# Patient Record
Sex: FEMALE | Race: BLACK OR AFRICAN AMERICAN | NOT HISPANIC OR LATINO | Employment: UNEMPLOYED | URBAN - METROPOLITAN AREA
[De-identification: names, ages, dates, MRNs, and addresses within clinical notes are randomized per-mention and may not be internally consistent; named-entity substitution may affect disease eponyms.]

---

## 2019-08-13 ENCOUNTER — EVALUATION (OUTPATIENT)
Dept: PHYSICAL THERAPY | Facility: CLINIC | Age: 54
End: 2019-08-13
Payer: COMMERCIAL

## 2019-08-13 DIAGNOSIS — M54.16 LUMBAR RADICULOPATHY: Primary | ICD-10-CM

## 2019-08-13 DIAGNOSIS — G89.29 CHRONIC PAIN OF RIGHT KNEE: ICD-10-CM

## 2019-08-13 DIAGNOSIS — M25.561 CHRONIC PAIN OF RIGHT KNEE: ICD-10-CM

## 2019-08-13 PROCEDURE — 97162 PT EVAL MOD COMPLEX 30 MIN: CPT | Performed by: PHYSICAL THERAPIST

## 2019-08-13 NOTE — LETTER
2019    [facility] [place] [address]    Patient: Marsha Arguello   YOB: 1965   Date of Visit: 2019     Encounter Diagnosis     ICD-10-CM    1  Lumbar radiculopathy M54 16    2  Chronic pain of right knee M25 561     G89 29        Dear Dr Maddie Arvizu:    Thank you for your recent referral of Marsha Arguello  Please review the attached evaluation summary from Radha's recent visit  Please verify that you agree with the plan of care by signing the attached order  If you have any questions or concerns, please do not hesitate to call  I sincerely appreciate the opportunity to share in the care of one of your patients and hope to have another opportunity to work with you in the near future  Sincerely,    Nik He, PT      Referring Provider:      I certify that I have read the below Plan of Care and certify the need for these services furnished under this plan of treatment while under my care  Jw Lauren PA-C  [address] [place] [address]: [phone]          PT Evaluation     Today's date: 2019  Patient name: Marsha Arguello  : 1965  MRN: [id number]  Referring provider: Saturnino Leblanc  Dx:   Encounter Diagnosis     ICD-10-CM    1  Lumbar radiculopathy M54 16    2  Chronic pain of right knee M25 561     G89 29                   Assessment  Assessment details: Patient is a 48year old female with signs and symptoms consistent with lumbar radiculopathy resulting in difficulty with ADLs and functional activities  She presents with deficits in lumbar ROM, flexibility, and LE strength with greatest deficits on R side vs  L  Additional special tests were deferred this date due to patient's pain  Directional preference was unable to be determined this date  She would benefit from skilled PT to increase overall mobility, decrease pain, and improve function     Impairments: abnormal gait, abnormal muscle firing, abnormal or restricted ROM, abnormal movement, activity intolerance, impaired physical strength, lacks appropriate home exercise program, pain with function, weight-bearing intolerance, poor posture  and poor body mechanics  Barriers to therapy: Patient plans to move by next month, will need to transition sites  Understanding of Dx/Px/POC: good   Prognosis: good    Goals  ST  Decrease pain at worst to 7/10 in 4 weeks  2  Increase RLE strength to 4/5 in 4 weeks  LT  Increase lumbar ROM to > 50% in all directions in 8 weeks  2  Able to lift > 10 lbs floor to waist with good mechanics in 8 weeks  3  Independent in HEP in 8 weeks  Plan  Plan details: Patient was encouraged on POC 2x/week but was agreeable to POC 1x/week  Patient would benefit from: skilled physical therapy  Referral necessary: No  Planned modality interventions: cryotherapy, TENS and thermotherapy: hydrocollator packs  Planned therapy interventions: abdominal trunk stabilization, balance, flexibility, functional ROM exercises, home exercise program, therapeutic exercise, therapeutic activities, stretching, strengthening, postural training, patient education, neuromuscular re-education, massage, manual therapy and joint mobilization  Frequency: 1x week  Duration in weeks: 8  Treatment plan discussed with: patient and referring physician        Subjective Evaluation    History of Present Illness  Mechanism of injury: 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  She notes that she did a lot of liting and walking  She notes that she started to notice that her back would hurt throughout the day  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  She notes that her legs were also hurting  She notes that this started in September last year  She notes that she was taking ibproufen daily and it wasn't helping   She notes that she had a patient who was total assistance, performing a lift she had pain in her back  She describes it as a crack  She notes that since then the back has been bad  She notes that at the time she sat down and rested thinking it would improve, but it didn't  She notes that the worker's compensation claim was denied  She notes that she has not worked since 2019  She notes that she has had follow-up with orthopedic and pain management since the injury  She notes that she does have intermittent numbness/tingling in R foot  She denies saddle paresthesias  She denies changes in bowel or bladder control  She notes that she is not currently working due to her injuries     Pain  Current pain ratin  At best pain ratin  At worst pain rating: 10  Location: low back into buttock (R) and into R knee, into the calf  Quality: sharp and tight  Relieving factors: rest, ice and medications  Aggravating factors: sitting and standing  Progression: worsening    Social Support  Lives in: multiple-level home      Diagnostic Tests  X-ray: abnormal  Treatments  Previous treatment: injection treatment and medication (back)  Patient Goals  Patient goals for therapy: increased strength, independence with ADLs/IADLs, decreased pain, increased motion, improved balance and return to work  Patient goal: learn exercises to do at home, and get more in control of my pain        Objective     Neurological Testing     Sensation     Lumbar   Left   Intact: light touch    Right   Diminished: light touch    Reflexes   Left   Clonus sign: negative    Right   Patellar (L4): normal (2+)  Clonus sign: negative    Active Range of Motion     Lumbar   Flexion: 50 degrees   Extension: 25 degrees   Left lateral flexion: 25 degrees       Right lateral flexion: 25 degrees   Left rotation: 50 degrees   Right rotation: 50 degrees     Additional Active Range of Motion Details  Repeated testing deferred secondary to patient's complaints of pain    Strength/Myotome Testing     Lumbar   Left Heel walk: normal  Toe walk: normal    Right   Toe walk: abnormal    Left Hip   Planes of Motion   Flexion: 4+  Abduction: 4  External rotation: 4    Right Hip   Planes of Motion   Flexion: 4-  Abduction: 4  External rotation: 3+    Left Knee   Flexion: 4  Extension: 4    Right Knee   Flexion: 4-  Extension: 4-    Left Ankle/Foot   Dorsiflexion: 4  Plantar flexion: 4    Right Ankle/Foot   Dorsiflexion: 4  Plantar flexion: 3+    Ambulation     Comments   Ambulates with Trendelenburg compensation with decreased trunk rotation and arm swing with decreased TKE on R stance phase      Flowsheet Rows      Most Recent Value   PT/OT G-Codes   Current Score  23   Projected Score  46   FOTO information reviewed  Yes             Diagnosis: Lumbar Radiculopathy   Precautions:    Manual Therapy 8/13/19                                               Exercise Diary  8/13/19                                                                                                                                                                       Modalities

## 2019-08-14 ENCOUNTER — TRANSCRIBE ORDERS (OUTPATIENT)
Dept: PHYSICAL THERAPY | Facility: CLINIC | Age: 54
End: 2019-08-14

## 2019-08-14 DIAGNOSIS — M25.561 CHRONIC PAIN OF RIGHT KNEE: ICD-10-CM

## 2019-08-14 DIAGNOSIS — G89.29 CHRONIC PAIN OF RIGHT KNEE: ICD-10-CM

## 2019-08-14 DIAGNOSIS — M54.16 LUMBAR RADICULOPATHY: Primary | ICD-10-CM

## 2019-08-19 ENCOUNTER — APPOINTMENT (OUTPATIENT)
Dept: PHYSICAL THERAPY | Facility: CLINIC | Age: 54
End: 2019-08-19
Payer: COMMERCIAL

## 2019-08-21 ENCOUNTER — APPOINTMENT (OUTPATIENT)
Dept: PHYSICAL THERAPY | Facility: CLINIC | Age: 54
End: 2019-08-21
Payer: COMMERCIAL

## 2019-08-22 ENCOUNTER — OFFICE VISIT (OUTPATIENT)
Dept: PHYSICAL THERAPY | Facility: CLINIC | Age: 54
End: 2019-08-22
Payer: COMMERCIAL

## 2019-08-22 DIAGNOSIS — M25.561 CHRONIC PAIN OF RIGHT KNEE: ICD-10-CM

## 2019-08-22 DIAGNOSIS — G89.29 CHRONIC PAIN OF RIGHT KNEE: ICD-10-CM

## 2019-08-22 DIAGNOSIS — M54.16 LUMBAR RADICULOPATHY: Primary | ICD-10-CM

## 2019-08-22 PROCEDURE — 97112 NEUROMUSCULAR REEDUCATION: CPT | Performed by: PHYSICAL THERAPIST

## 2019-08-22 PROCEDURE — 97110 THERAPEUTIC EXERCISES: CPT | Performed by: PHYSICAL THERAPIST

## 2019-08-22 NOTE — PROGRESS NOTES
Daily Note     Today's date: 2019  Patient name: Justin Izaguirre  : 1965  MRN: [id number]  Referring provider: Star Ruano PA-C  Dx:   Encounter Diagnosis     ICD-10-CM    1  Lumbar radiculopathy M54 16    2  Chronic pain of right knee M25 561     G89 29                   Subjective: Patient reports that the R knee is stiff and sore  She notes that she has a knot in the R thigh  She notes pain and difficulty with going up stairs due to knee pain  Objective: See treatment diary below      Assessment: Tolerated treatment well  Patient would benefit from continued PT  She tolerated session well with flexion preference demonstrated  She does require frequent rest breaks to avoid SOB  She tolerated trunk rotations with minimal knee flexion secondary to tightness and pain  She was challenged with quad sets but demonstrated improved activation with increased repetitions  She had no increased pain post session  Plan: Continue per plan of care  Trial gastroc stretch as able and heel slides for R knee        Diagnosis: Lumbar Radiculopathy   Precautions:    Manual Therapy 19                                              Exercise Diary  19      Recumbent Bike  5'       Quad Set  5"x20      Trunk Rotations  20x      Heel slides                                                Seated ball flexion  5"x10ea      Chair hamstring stretch  10x10"                                                                              Modalities  19      CP - knee  10'       HP - L/S  10' concurrent

## 2019-08-26 ENCOUNTER — APPOINTMENT (OUTPATIENT)
Dept: PHYSICAL THERAPY | Facility: CLINIC | Age: 54
End: 2019-08-26
Payer: COMMERCIAL

## 2019-08-27 ENCOUNTER — OFFICE VISIT (OUTPATIENT)
Dept: PHYSICAL THERAPY | Facility: CLINIC | Age: 54
End: 2019-08-27
Payer: COMMERCIAL

## 2019-08-27 DIAGNOSIS — G89.29 CHRONIC PAIN OF RIGHT KNEE: ICD-10-CM

## 2019-08-27 DIAGNOSIS — M25.561 CHRONIC PAIN OF RIGHT KNEE: ICD-10-CM

## 2019-08-27 DIAGNOSIS — M54.16 LUMBAR RADICULOPATHY: Primary | ICD-10-CM

## 2019-08-27 PROCEDURE — 97110 THERAPEUTIC EXERCISES: CPT | Performed by: PHYSICAL THERAPIST

## 2019-08-27 PROCEDURE — 97112 NEUROMUSCULAR REEDUCATION: CPT | Performed by: PHYSICAL THERAPIST

## 2019-08-27 NOTE — PROGRESS NOTES
Daily Note     Today's date: 2019  Patient name: Donna Loza  : 1965  MRN: [id number]  Referring provider: Matt Mendoza PA-C  Dx:   Encounter Diagnosis     ICD-10-CM    1  Lumbar radiculopathy M54 16    2  Chronic pain of right knee M25 561     G89 29                   Subjective: Patient reports that she was sore after last session, but it got better after she laid down for a bit  She notes that she has increased swelling and tightness/pain in the R knee today  She notes her daughter's house where she's staying has a lot of stairs  Objective: See treatment diary below      Assessment: Tolerated treatment well  Patient would benefit from continued PT  She had improved AROM of R knee post session  She has limited sitting tolerance, requiring standing rest breaks  She does demonstrate improved lumbar rotation mobility evident during trunk rotations  She remains challenged with R quad activation evident during quad sets  She would benefit from progression of standing exercises as able  She had no increase in subjective report of pain post session  Plan: Continue per plan of care  Trial TKE as able        Diagnosis: Lumbar Radiculopathy   Precautions:    Manual Therapy 19                                             Exercise Diary  19     Recumbent Bike  5'  5'     Quad Set  5"x20 5"x20     Trunk Rotations  20x 20x     Heel slides        Supine fall outs   Red 10xea     clamshells        SLR flexion                        Seated ball flexion  5"x10ea 5"x20     Chair hamstring stretch  10x10" 10x10"     Seated heel slides with foam roll    5"x20     TKE                                                                Modalities  19     CP - knee  10'  10'     HP - L/S  10' concurrent 10' concurrent

## 2019-09-05 ENCOUNTER — APPOINTMENT (OUTPATIENT)
Dept: PHYSICAL THERAPY | Facility: CLINIC | Age: 54
End: 2019-09-05
Payer: COMMERCIAL

## 2019-09-06 ENCOUNTER — OFFICE VISIT (OUTPATIENT)
Dept: PHYSICAL THERAPY | Facility: CLINIC | Age: 54
End: 2019-09-06
Payer: COMMERCIAL

## 2019-09-06 DIAGNOSIS — M25.561 CHRONIC PAIN OF RIGHT KNEE: ICD-10-CM

## 2019-09-06 DIAGNOSIS — M54.16 LUMBAR RADICULOPATHY: Primary | ICD-10-CM

## 2019-09-06 DIAGNOSIS — G89.29 CHRONIC PAIN OF RIGHT KNEE: ICD-10-CM

## 2019-09-06 PROCEDURE — 97110 THERAPEUTIC EXERCISES: CPT | Performed by: PHYSICAL THERAPIST

## 2019-09-06 PROCEDURE — 97140 MANUAL THERAPY 1/> REGIONS: CPT | Performed by: PHYSICAL THERAPIST

## 2019-09-06 PROCEDURE — 97112 NEUROMUSCULAR REEDUCATION: CPT | Performed by: PHYSICAL THERAPIST

## 2019-09-06 NOTE — PROGRESS NOTES
Daily Note     Today's date: 2019  Patient name: Yolanda Elias  : 1965  MRN: [id number]  Referring provider: Stephania Ardon PA-C  Dx:   Encounter Diagnosis     ICD-10-CM    1  Lumbar radiculopathy M54 16    2  Chronic pain of right knee M25 561     G89 29                   Subjective: She notes that she feels really tight today  She notes that she saw pain management yesterday and they changed her pain medication  She notes that she's been sleeping on the floor at her daughters and she's been tossing and turning a lot and she thinks her increased hip pain is from that  Objective: See treatment diary below      Assessment: Tolerated treatment well  Patient would benefit from continued PT  She tolerated initiation of piriformis stretch well this date with both manual and self stretch  She had decreased subjective report of pain post session  She demonstrated greater R piriformis restriction than L  She had improved gait speed post session with less muscle guarding  Plan: Continue per plan of care  Progress with core and glute strengthening as able        Diagnosis: Lumbar Radiculopathy   Precautions:    Manual Therapy 19    Gentle hamstring, gastroc, piriformis stretch    10' RS                                    Exercise Diary  19    Recumbent Bike  5'  5' 5'     Quad Set  5"x20 5"x20     Trunk Rotations  20x 20x 20x    Heel slides        Supine fall outs   Red 10xea     clamshells        SLR flexion        Supine piriformis stretch with towel    10x10"            Seated ball flexion  5"x10ea 5"x20 5"x20    Chair hamstring stretch  10x10" 10x10"     Seated heel slides with foam roll    5"x20 5"x20    TKE                                                                Modalities  19     CP - knee  10'  10'     HP - L/S  10' concurrent 10' concurrent

## 2019-09-10 ENCOUNTER — APPOINTMENT (OUTPATIENT)
Dept: PHYSICAL THERAPY | Facility: CLINIC | Age: 54
End: 2019-09-10
Payer: COMMERCIAL

## 2019-09-12 ENCOUNTER — TRANSCRIBE ORDERS (OUTPATIENT)
Dept: PHYSICAL THERAPY | Facility: CLINIC | Age: 54
End: 2019-09-12

## 2019-09-12 ENCOUNTER — EVALUATION (OUTPATIENT)
Dept: PHYSICAL THERAPY | Facility: CLINIC | Age: 54
End: 2019-09-12
Payer: COMMERCIAL

## 2019-09-12 DIAGNOSIS — M54.16 LUMBAR RADICULOPATHY: Primary | ICD-10-CM

## 2019-09-12 DIAGNOSIS — G89.29 CHRONIC PAIN OF RIGHT KNEE: ICD-10-CM

## 2019-09-12 DIAGNOSIS — M25.561 CHRONIC PAIN OF RIGHT KNEE: ICD-10-CM

## 2019-09-12 PROCEDURE — 97140 MANUAL THERAPY 1/> REGIONS: CPT | Performed by: PHYSICAL THERAPIST

## 2019-09-12 PROCEDURE — 97112 NEUROMUSCULAR REEDUCATION: CPT | Performed by: PHYSICAL THERAPIST

## 2019-09-12 PROCEDURE — 97110 THERAPEUTIC EXERCISES: CPT | Performed by: PHYSICAL THERAPIST

## 2019-09-12 NOTE — LETTER
2019    [facility] [place] [address]    Patient: Garett Rodriguez   YOB: 1965   Date of Visit: 2019     Encounter Diagnosis     ICD-10-CM    1  Lumbar radiculopathy M54 16    2  Chronic pain of right knee M25 561     G89 29        Dear Dr Han Maldonado:    Thank you for your recent referral of Garett Rodriguez  Please review the attached evaluation summary from Radha's recent visit  Please verify that you agree with the plan of care by signing the attached order  If you have any questions or concerns, please do not hesitate to call  I sincerely appreciate the opportunity to share in the care of one of your patients and hope to have another opportunity to work with you in the near future  Sincerely,    Chantale Barros, PT      Referring Provider:      I certify that I have read the below Plan of Care and certify the need for these services furnished under this plan of treatment while under my care  Janet Burrell PA-C  [address] [place] [address]: [phone]          PT Re-Evaluation     Today's date: 2019  Patient name: Garett Rodriguez  : 1965  MRN: [id number]  Referring provider: Juliette Hernandez PA-C  Dx:   Encounter Diagnosis     ICD-10-CM    1  Lumbar radiculopathy M54 16    2  Chronic pain of right knee M25 561     G89 29                   Assessment  Assessment details: Patient is a 48year old female with signs and symptoms consistent with lumbar radiculopathy resulting in difficulty with ADLs and functional activities  She presents with improvements in ROM, flexibility, and strength with greatest improvements in lumbar ROM  She remains limited in R knee ROM, flexibility secondary to effusion and pain  She would benefit from continued skilled PT to address these residual deficits   She was agreeable to continued POC to include transition to strengthening of both core and hip stabilization  She was agreeable to plan to increase POC to 2x/week from 1x/week when she has increased dependable transportation  Impairments: abnormal gait, abnormal muscle firing, abnormal or restricted ROM, abnormal movement, activity intolerance, impaired physical strength, lacks appropriate home exercise program, pain with function, weight-bearing intolerance, poor posture  and poor body mechanics  Barriers to therapy: Lumbar radiculopathy and R knee osteoarthritis; limited POC secondary to transportation  Understanding of Dx/Px/POC: good   Prognosis: good    Goals  ST  Decrease pain at worst to 7/10 in 4 weeks  - Progressing  2  Increase RLE strength to 4/5 in 4 weeks  - Progressing     LT  Increase lumbar ROM to > 50% in all directions in 8 weeks  - Progressing (flexion improved to 75%)  2  Able to lift > 10 lbs floor to waist with good mechanics in 8 weeks  - Not met  3  Independent in HEP in 8 weeks       Plan  Plan details: Patient was encouraged on POC 2x/week but was agreeable to POC 1x/week for now  Patient would benefit from: skilled physical therapy  Referral necessary: No  Planned modality interventions: cryotherapy, TENS and thermotherapy: hydrocollator packs  Planned therapy interventions: abdominal trunk stabilization, balance, flexibility, functional ROM exercises, home exercise program, therapeutic exercise, therapeutic activities, stretching, strengthening, postural training, patient education, neuromuscular re-education, massage, manual therapy and joint mobilization  Frequency: 2x week  Duration in weeks: 4  Treatment plan discussed with: patient and referring physician        Subjective Evaluation    Pain  Current pain ratin (knee (6), back (8))  At best pain ratin  At worst pain rating: 10  Location: low back into buttock (R) and into R knee, into the calf  Quality: sharp, tight and dull ache  Relieving factors: rest, ice, medications and heat  Aggravating factors: sitting and standing  Progression: improved    Social Support  Lives in: multiple-level home      Diagnostic Tests  X-ray: abnormal  Treatments  Previous treatment: injection treatment and medication (back)  Patient Goals  Patient goals for therapy: increased strength, independence with ADLs/IADLs, decreased pain, increased motion, improved balance and return to work  Patient goal: learn exercises to do at home, and get more in control of my pain - Progressing    Patient reports 30% improvement since start of PT  She notes that she can move her legs more and is feeling better with stretching her legs  She notes that she still has pain and she knows that she'll have this, but she's just starting to do things anyway  Stairs are less difficulty, with most difficulty coming down vs  Up  Objective     Neurological Testing     Sensation     Lumbar   Left   Intact: light touch    Right   Diminished: light touch    Reflexes   Left   Clonus sign: negative    Right   Patellar (L4): normal (2+)  Clonus sign: negative    Active Range of Motion     Lumbar   Flexion: 100 degrees   Extension: 25 degrees   Left lateral flexion: 50 degrees       Right lateral flexion: 100 degrees   Left rotation: 50 degrees   Right rotation: 75 degrees   Left Knee   Flexion: 120 degrees   Extension: 0 degrees     Right Knee   Flexion: 107 degrees   Extension: 0 degrees     Additional Active Range of Motion Details  Hyper Extension available when not cued to stop a neutral, R knee rests at about -5 to -10 comfort        Strength/Myotome Testing     Lumbar   Left   Heel walk: normal  Toe walk: normal    Right   Toe walk: abnormal    Left Hip   Planes of Motion   Flexion: 4+  Abduction: 4+  External rotation: 4+    Right Hip   Planes of Motion   Flexion: 4-  Abduction: 4+  External rotation: 3+    Left Knee   Flexion: 4  Extension: 4+    Right Knee   Flexion: 4-  Extension: 4-    Left Ankle/Foot   Dorsiflexion: 4+    Right Ankle/Foot   Dorsiflexion: 4+    Swelling     Left Knee Girth Measurement (cm)   Joint line: 37 5 cm    Right Knee Girth Measurement (cm)   Joint line: 40 cm    Ambulation     Comments   Ambulates with Trendelenburg compensation with decreased trunk rotation and arm swing with decreased TKE on R stance phase         Diagnosis: Lumbar Radiculopathy   Precautions:    Manual Therapy 8/13/19 8/22/19 8/27/19 9/6/19 9/12/19   Gentle hamstring, gastroc, piriformis stretch    10' RS                            Re-Evaluation     20' RS   Exercise Diary  8/13/19 8/22/19 8/27/19 9/6/19 9/12/19   Recumbent Bike  5'  5' 5'     Quad Set  5"x20 5"x20     Trunk Rotations  20x 20x 20x    Heel slides        Supine fall outs   Red 10xea     clamshells        SLR flexion        Supine piriformis stretch with towel    10x10"            Seated ball flexion  5"x10ea 5"x20 5"x20    Seated chair flexion     10x   Chair hamstring stretch  10x10" 10x10"     Seated heel slides with foam roll    5"x20 5"x20    TKE        Seated t/S Ext 1/2 FR     20x                                                   Modalities  8/22/19 8/27/19     CP - knee  10'  10'     HP - L/S  10' concurrent 10' concurrent

## 2019-09-12 NOTE — PROGRESS NOTES
PT Re-Evaluation     Today's date: 2019  Patient name: Austin Whitman  : 1965  MRN: [id number]  Referring provider: Carmencita Farmer PA-C  Dx:   Encounter Diagnosis     ICD-10-CM    1  Lumbar radiculopathy M54 16    2  Chronic pain of right knee M25 561     G89 29                   Assessment  Assessment details: Patient is a 48year old female with signs and symptoms consistent with lumbar radiculopathy resulting in difficulty with ADLs and functional activities  She presents with improvements in ROM, flexibility, and strength with greatest improvements in lumbar ROM  She remains limited in R knee ROM, flexibility secondary to effusion and pain  She would benefit from continued skilled PT to address these residual deficits  She was agreeable to continued POC to include transition to strengthening of both core and hip stabilization  She was agreeable to plan to increase POC to 2x/week from 1x/week when she has increased dependable transportation  Impairments: abnormal gait, abnormal muscle firing, abnormal or restricted ROM, abnormal movement, activity intolerance, impaired physical strength, lacks appropriate home exercise program, pain with function, weight-bearing intolerance, poor posture  and poor body mechanics  Barriers to therapy: Lumbar radiculopathy and R knee osteoarthritis; limited POC secondary to transportation  Understanding of Dx/Px/POC: good   Prognosis: good    Goals  ST  Decrease pain at worst to 7/10 in 4 weeks  - Progressing  2  Increase RLE strength to 4/5 in 4 weeks  - Progressing     LT  Increase lumbar ROM to > 50% in all directions in 8 weeks  - Progressing (flexion improved to 75%)  2  Able to lift > 10 lbs floor to waist with good mechanics in 8 weeks  - Not met  3  Independent in HEP in 8 weeks       Plan  Plan details: Patient was encouraged on POC 2x/week but was agreeable to POC 1x/week for now  Patient would benefit from: skilled physical therapy  Referral necessary: No  Planned modality interventions: cryotherapy, TENS and thermotherapy: hydrocollator packs  Planned therapy interventions: abdominal trunk stabilization, balance, flexibility, functional ROM exercises, home exercise program, therapeutic exercise, therapeutic activities, stretching, strengthening, postural training, patient education, neuromuscular re-education, massage, manual therapy and joint mobilization  Frequency: 2x week  Duration in weeks: 4  Treatment plan discussed with: patient and referring physician        Subjective Evaluation    Pain  Current pain ratin (knee (6), back (8))  At best pain ratin  At worst pain rating: 10  Location: low back into buttock (R) and into R knee, into the calf  Quality: sharp, tight and dull ache  Relieving factors: rest, ice, medications and heat  Aggravating factors: sitting and standing  Progression: improved    Social Support  Lives in: multiple-level home      Diagnostic Tests  X-ray: abnormal  Treatments  Previous treatment: injection treatment and medication (back)  Patient Goals  Patient goals for therapy: increased strength, independence with ADLs/IADLs, decreased pain, increased motion, improved balance and return to work  Patient goal: learn exercises to do at home, and get more in control of my pain - Progressing    Patient reports 30% improvement since start of PT  She notes that she can move her legs more and is feeling better with stretching her legs  She notes that she still has pain and she knows that she'll have this, but she's just starting to do things anyway  Stairs are less difficulty, with most difficulty coming down vs  Up       Objective     Neurological Testing     Sensation     Lumbar   Left   Intact: light touch    Right   Diminished: light touch    Reflexes   Left   Clonus sign: negative    Right   Patellar (L4): normal (2+)  Clonus sign: negative    Active Range of Motion     Lumbar   Flexion: 100 degrees   Extension: 25 degrees   Left lateral flexion: 50 degrees       Right lateral flexion: 100 degrees   Left rotation: 50 degrees   Right rotation: 75 degrees   Left Knee   Flexion: 120 degrees   Extension: 0 degrees     Right Knee   Flexion: 107 degrees   Extension: 0 degrees     Additional Active Range of Motion Details  Hyper Extension available when not cued to stop a neutral, R knee rests at about -5 to -10 comfort        Strength/Myotome Testing     Lumbar   Left   Heel walk: normal  Toe walk: normal    Right   Toe walk: abnormal    Left Hip   Planes of Motion   Flexion: 4+  Abduction: 4+  External rotation: 4+    Right Hip   Planes of Motion   Flexion: 4-  Abduction: 4+  External rotation: 3+    Left Knee   Flexion: 4  Extension: 4+    Right Knee   Flexion: 4-  Extension: 4-    Left Ankle/Foot   Dorsiflexion: 4+    Right Ankle/Foot   Dorsiflexion: 4+    Swelling     Left Knee Girth Measurement (cm)   Joint line: 37 5 cm    Right Knee Girth Measurement (cm)   Joint line: 40 cm    Ambulation     Comments   Ambulates with Trendelenburg compensation with decreased trunk rotation and arm swing with decreased TKE on R stance phase         Diagnosis: Lumbar Radiculopathy   Precautions:    Manual Therapy 8/13/19 8/22/19 8/27/19 9/6/19 9/12/19   Gentle hamstring, gastroc, piriformis stretch    10' RS                            Re-Evaluation     20' RS   Exercise Diary  8/13/19 8/22/19 8/27/19 9/6/19 9/12/19   Recumbent Bike  5'  5' 5'     Quad Set  5"x20 5"x20     Trunk Rotations  20x 20x 20x    Heel slides        Supine fall outs   Red 10xea     clamshells        SLR flexion        Supine piriformis stretch with towel    10x10"            Seated ball flexion  5"x10ea 5"x20 5"x20    Seated chair flexion     10x   Chair hamstring stretch  10x10" 10x10"     Seated heel slides with foam roll    5"x20 5"x20    TKE        Seated t/S Ext 1/2 FR     20x                                                   Modalities  8/22/19 8/27/19     CP - knee 10'  10'     HP - L/S  10' concurrent 10' concurrent

## 2019-09-13 ENCOUNTER — TRANSCRIBE ORDERS (OUTPATIENT)
Dept: PHYSICAL THERAPY | Facility: CLINIC | Age: 54
End: 2019-09-13

## 2019-09-13 DIAGNOSIS — M54.16 LUMBAR RADICULOPATHY: Primary | ICD-10-CM

## 2019-09-13 DIAGNOSIS — G89.29 CHRONIC PAIN OF RIGHT KNEE: ICD-10-CM

## 2019-09-13 DIAGNOSIS — M25.561 CHRONIC PAIN OF RIGHT KNEE: ICD-10-CM

## 2019-09-24 ENCOUNTER — APPOINTMENT (OUTPATIENT)
Dept: PHYSICAL THERAPY | Facility: CLINIC | Age: 54
End: 2019-09-24
Payer: COMMERCIAL

## 2019-09-26 ENCOUNTER — APPOINTMENT (OUTPATIENT)
Dept: PHYSICAL THERAPY | Facility: CLINIC | Age: 54
End: 2019-09-26
Payer: COMMERCIAL

## 2019-10-24 NOTE — PROGRESS NOTES
PT Discharge    Today's date: 10/24/2019  Patient name: Rommel Alex  : 1965  MRN: [id number]  Referring provider: Veronica Lopez PA-C  Dx:   Encounter Diagnosis     ICD-10-CM    1  Lumbar radiculopathy M54 16    2  Chronic pain of right knee M25 561     G89 29        Start Time: 1005  Stop Time: 1050  Total time in clinic (min): 45 minutes    Assessment/Plan    Subjective     Patient was last treated on 19  She continued to cancel/no show/reschedule her future scheduled appointments due to transportation issues  She was contacted to reschedule and asked to confirm future appointments  She did call to cancel future appointments  She is discharged at this time due to noncompliance  She would be welcome back for PT in the future as needed       Objective    Flowsheet Rows      Most Recent Value   PT/OT G-Codes   Current Score  32   Projected Score  23   FOTO information reviewed  Yes

## 2020-01-21 ENCOUNTER — OFFICE VISIT (OUTPATIENT)
Dept: OBGYN CLINIC | Facility: CLINIC | Age: 55
End: 2020-01-21
Payer: COMMERCIAL

## 2020-01-21 VITALS — WEIGHT: 160 LBS | BODY MASS INDEX: 32.25 KG/M2 | HEIGHT: 59 IN

## 2020-01-21 DIAGNOSIS — M70.51 PES ANSERINUS BURSITIS OF RIGHT KNEE: Primary | ICD-10-CM

## 2020-01-21 PROCEDURE — 20610 DRAIN/INJ JOINT/BURSA W/O US: CPT | Performed by: ORTHOPAEDIC SURGERY

## 2020-01-21 PROCEDURE — 99243 OFF/OP CNSLTJ NEW/EST LOW 30: CPT | Performed by: ORTHOPAEDIC SURGERY

## 2020-01-21 RX ORDER — IBUPROFEN 800 MG/1
TABLET ORAL
COMMUNITY
End: 2021-07-14

## 2020-01-21 RX ORDER — ALBUTEROL SULFATE 90 UG/1
AEROSOL, METERED RESPIRATORY (INHALATION)
COMMUNITY

## 2020-01-21 RX ORDER — FLUTICASONE PROPIONATE 110 UG/1
AEROSOL, METERED RESPIRATORY (INHALATION)
COMMUNITY

## 2020-01-21 RX ORDER — TIZANIDINE 4 MG/1
TABLET ORAL
COMMUNITY
End: 2020-03-03

## 2020-01-21 RX ORDER — LIDOCAINE HYDROCHLORIDE 10 MG/ML
3 INJECTION, SOLUTION INFILTRATION; PERINEURAL
Status: COMPLETED | OUTPATIENT
Start: 2020-01-21 | End: 2020-01-21

## 2020-01-21 RX ORDER — CLOTRIMAZOLE 1 G/ML
SOLUTION TOPICAL
COMMUNITY

## 2020-01-21 RX ORDER — OXYCODONE HYDROCHLORIDE AND ACETAMINOPHEN 5; 325 MG/1; MG/1
TABLET ORAL
COMMUNITY

## 2020-01-21 RX ORDER — BETAMETHASONE SODIUM PHOSPHATE AND BETAMETHASONE ACETATE 3; 3 MG/ML; MG/ML
6 INJECTION, SUSPENSION INTRA-ARTICULAR; INTRALESIONAL; INTRAMUSCULAR; SOFT TISSUE
Status: COMPLETED | OUTPATIENT
Start: 2020-01-21 | End: 2020-01-21

## 2020-01-21 RX ADMIN — LIDOCAINE HYDROCHLORIDE 3 ML: 10 INJECTION, SOLUTION INFILTRATION; PERINEURAL at 11:18

## 2020-01-21 RX ADMIN — BETAMETHASONE SODIUM PHOSPHATE AND BETAMETHASONE ACETATE 6 MG: 3; 3 INJECTION, SUSPENSION INTRA-ARTICULAR; INTRALESIONAL; INTRAMUSCULAR; SOFT TISSUE at 11:18

## 2020-01-21 NOTE — LETTER
January 21, 2020     Caity Martinez MD  [address]  [address] [facility] [place]    Patient: Jovanny Hager   YOB: 1965   Date of Visit: 1/21/2020       Dear Dr Debo Toro:    Thank you for referring Melvina Mackay to me for evaluation  Below are my notes for this consultation  If you have questions, please do not hesitate to call me  I look forward to following your patient along with you  Sincerely,        Ana María Sepulveda MD        CC: No Recipients  Ana María Sepulveda MD  1/21/2020 11:20 AM  Sign at close encounter  Chief Complaint   Patient presents with    Right Knee - Pain           Assessment:  Right pes anserine bursitis    Plan : This patient has definite bursitis where the medial hamstrings insert into the tibia (pes anserine bursa)  I will treat this locally with cortisone injection to decrease her symptoms  I did try to add some gentle hamstring stretching to stretch out these muscles, but it is not clear to me whether this is an isolated problem or in fact is referred pain coming from her back  I made it clear to her that if the exercises make her back pain worse, then she has to stop the exercises, because we do not want to make one area better at the price of the other  She can put ice on the area in a small trash bag or bag of frozen peas for 15 minutes 4 times a day if needed and can take Advil, Aleve, or Tylenol if needed  I will see her back again here in 2 months for clinical re-evaluation and routine follow-up  HPI:   This is a 48year-old anda American female presenting today for orthopedic consultation, sent by her pain management physician  This is regarding her right knee pain  She has a long history from her previous residence in [place] where she talks about 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  She was treated between 2 different physicians and was ultimately asked to resigned her job    She states she was going to the emergency room on multiple occasions after work due to her excessive pain  She is discharged and she has been told that her knee pain is not related to her back  She reports that she had an MRI done that revealed a spondylolisthesis  She has received injections in her back which choir her back symptoms been on her knee  She has also been attending physical therapy for about a month for both the back and knee  She obtained ibuprofen 100 mg from a friend which does quiet her symptoms but she is hesitant to take these long term  She has a history of anxiety and depression       PMHx:         Past Medical History:   Diagnosis Date    Anxiety     Arthritis     hands    Asthma     Depression        Past Surgical History:   Procedure Laterality Date     SECTION         Family History   Family history unknown: Yes       Social History     Socioeconomic History    Marital status: Unknown     Spouse name: Not on file    Number of children: Not on file    Years of education: Not on file    Highest education level: Not on file   Occupational History    Not on file   Social Needs    Financial resource strain: Not on file    Food insecurity:     Worry: Not on file     Inability: Not on file    Transportation needs:     Medical: Not on file     Non-medical: Not on file   Tobacco Use    Smoking status: Current Some Day Smoker     Packs/day: 1 00     Years: 30 00     Pack years: 30 00     Types: Cigarettes    Smokeless tobacco: Former User   Substance and Sexual Activity    Alcohol use: Not Currently     Frequency: Never    Drug use: Never    Sexual activity: Not on file   Lifestyle    Physical activity:     Days per week: Not on file     Minutes per session: Not on file    Stress: Not on file   Relationships    Social connections:     Talks on phone: Not on file     Gets together: Not on file     Attends Voodoo service: Not on file     Active member of club or organization: Not on file     Attends meetings of clubs or organizations: Not on file     Relationship status: Not on file    Intimate partner violence:     Fear of current or ex partner: Not on file     Emotionally abused: Not on file     Physically abused: Not on file     Forced sexual activity: Not on file   Other Topics Concern    Not on file   Social History Narrative    Not on file       Current Outpatient Medications   Medication Sig Dispense Refill    albuterol (PROVENTIL HFA,VENTOLIN HFA) 90 mcg/act inhaler albuterol sulfate HFA 90 mcg/actuation aerosol inhaler      clotrimazole 1 % external solution clotrimazole 1 % topical solution      DULoxetine HCl 30 MG CSDR duloxetine 30 mg capsule,delayed release      fluticasone (FLOVENT HFA) 110 MCG/ACT inhaler Flovent  mcg/actuation aerosol inhaler      ibuprofen (MOTRIN) 800 mg tablet ibuprofen 800 mg tablet      oxyCODONE-acetaminophen (PERCOCET) 5-325 mg per tablet oxycodone-acetaminophen 5 mg-325 mg tablet      tiZANidine (ZANAFLEX) 4 mg tablet tizanidine 4 mg tablet       No current facility-administered medications for this visit  Allergies: Patient has no known allergies  ROS:  Positive for orthopedic complaints noted above  The remaining 11/12 systems on the intake sheet that I reviewed were negative  PE:  Ht 4' 11 25" (1 505 m)   Wt 72 6 kg (160 lb)   BMI 32 04 kg/m²    Constitutional: The patient was  oriented to person, place, and time  Well-developed and well-nourished  In no acute distress  HEENT: Vision intact  Hearing normal  Swallowing normal   Head: Normocephalic  Cardiovascular: Intact distal pulses  Pulse regular  Pulmonary/Chest: Effort normal  No respiratory distress  Neurological: Alert and oriented to person, place, and time  Skin: Skin is warm  Psychiatric: Normal mood and affect  Ortho Exam:  On today's exam of the right knee, she was not limping using external walking aids    There is no swelling, ecchymosis, redness or signs of infection  The skin was warm, dry and well perfused  She is tender to palpation at the left medial tibia over the pes anserine bursa  She normal range of motion bilaterally with flexion and extension going from 0-120 degrees  This did knees were stable to varus/valgus and AP stress  The patella tracks centrally without crepitus  Patellar compression was negative today  Sensation was intact to light touch and good capillary refill at all toes  Distal pulses were intact  There is no calf tenderness, popliteal adenopathy or cellulitis noted  Studies reviewed:  X-ray report of nonweightbearing right knee x-rays, not the films themselves, from [facility] from 11/18/2019 were negative for fracture, dislocation or degenerative change  No soft tissue calcification was reported       Large joint arthrocentesis: R anserine bursa  Date/Time: 1/21/2020 11:18 AM  Site marked: site marked  Supporting Documentation  Indications: pain   Procedure Details  Location: knee - R anserine bursa  Preparation: Patient was prepped and draped in the usual sterile fashion  Needle size: 25 G  Ultrasound guidance: no  Approach: anteromedial  Medications administered: 3 mL lidocaine 1 %; 6 mg betamethasone acetate-betamethasone sodium phosphate 6 (3-3) mg/mL    Patient tolerance: patient tolerated the procedure well with no immediate complications  Dressing:  Sterile dressing applied            Scribe Attestation    I,:   Flavia Castro MA am acting as a scribe while in the presence of the attending physician :        I,:   Leann Gil MD personally performed the services described in this documentation    as scribed in my presence :

## 2020-01-21 NOTE — PROGRESS NOTES
Chief Complaint   Patient presents with    Right Knee - Pain           Assessment:  Right pes anserine bursitis    Plan : This patient has definite bursitis where the medial hamstrings insert into the tibia (pes anserine bursa)  I will treat this locally with cortisone injection to decrease her symptoms  I did try to add some gentle hamstring stretching to stretch out these muscles, but it is not clear to me whether this is an isolated problem or in fact is referred pain coming from her back  I made it clear to her that if the exercises make her back pain worse, then she has to stop the exercises, because we do not want to make one area better at the price of the other  She can put ice on the area in a small trash bag or bag of frozen peas for 15 minutes 4 times a day if needed and can take Advil, Aleve, or Tylenol if needed  I will see her back again here in 2 months for clinical re-evaluation and routine follow-up  HPI:   This is a 48year-old [facility] American female presenting today for orthopedic consultation, sent by her pain management physician  This is regarding her right knee pain  She has a long history from her previous residence in [place] where she talks about 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  She was treated between 2 different physicians and was ultimately asked to resigned her job  She states she was going to the emergency room on multiple occasions after work due to her excessive pain  She is discharged and she has been told that her knee pain is not related to her back  She reports that she had an MRI done that revealed a spondylolisthesis  She has received injections in her back which choir her back symptoms been on her knee  She has also been attending physical therapy for about a month for both the back and knee   She obtained ibuprofen 100 mg from a friend which does quiet her symptoms but she is hesitant to take these long term   She has a history of anxiety and depression       PMHx:         Past Medical History:   Diagnosis Date    Anxiety     Arthritis     hands    Asthma     Depression        Past Surgical History:   Procedure Laterality Date     SECTION         Family History   Family history unknown: Yes       Social History     Socioeconomic History    Marital status: Unknown     Spouse name: Not on file    Number of children: Not on file    Years of education: Not on file    Highest education level: Not on file   Occupational History    Not on file   Social Needs    Financial resource strain: Not on file    Food insecurity:     Worry: Not on file     Inability: Not on file    Transportation needs:     Medical: Not on file     Non-medical: Not on file   Tobacco Use    Smoking status: Current Some Day Smoker     Packs/day:  00     Years: 30 00     Pack years: 30      Types: Cigarettes    Smokeless tobacco: Former User   Substance and Sexual Activity    Alcohol use: Not Currently     Frequency: Never    Drug use: Never    Sexual activity: Not on file   Lifestyle    Physical activity:     Days per week: Not on file     Minutes per session: Not on file    Stress: Not on file   Relationships    Social connections:     Talks on phone: Not on file     Gets together: Not on file     Attends Roman [employer] service: Not on file     Active member of club or organization: Not on file     Attends meetings of clubs or organizations: Not on file     Relationship status: Not on file    Intimate partner violence:     Fear of current or ex partner: Not on file     Emotionally abused: Not on file     Physically abused: Not on file     Forced sexual activity: Not on file   Other Topics Concern    Not on file   Social History Narrative    Not on file       Current Outpatient Medications   Medication Sig Dispense Refill    albuterol (PROVENTIL HFA,VENTOLIN HFA) 90 mcg/act inhaler albuterol sulfate HFA 90 mcg/actuation aerosol inhaler      clotrimazole 1 % external solution clotrimazole 1 % topical solution      DULoxetine HCl 30 MG CSDR duloxetine 30 mg capsule,delayed release      fluticasone (FLOVENT HFA) 110 MCG/ACT inhaler Flovent  mcg/actuation aerosol inhaler      ibuprofen (MOTRIN) 800 mg tablet ibuprofen 800 mg tablet      oxyCODONE-acetaminophen (PERCOCET) 5-325 mg per tablet oxycodone-acetaminophen 5 mg-325 mg tablet      tiZANidine (ZANAFLEX) 4 mg tablet tizanidine 4 mg tablet       No current facility-administered medications for this visit  Allergies: Patient has no known allergies  ROS:  Positive for orthopedic complaints noted above  The remaining 11/12 systems on the intake sheet that I reviewed were negative  PE:  Ht 4' 11 25" (1 505 m)   Wt 72 6 kg (160 lb)   BMI 32 04 kg/m²   Constitutional: The patient was  oriented to person, place, and time  Well-developed and well-nourished  In no acute distress  HEENT: Vision intact  Hearing normal  Swallowing normal   Head: Normocephalic  Cardiovascular: Intact distal pulses  Pulse regular  Pulmonary/Chest: Effort normal  No respiratory distress  Neurological: Alert and oriented to person, place, and time  Skin: Skin is warm  Psychiatric: Normal mood and affect  Ortho Exam:  On today's exam of the right knee, she was not limping using external walking aids  There is no swelling, ecchymosis, redness or signs of infection  The skin was warm, dry and well perfused  She is tender to palpation at the left medial tibia over the pes anserine bursa  She normal range of motion bilaterally with flexion and extension going from 0-120 degrees  This did knees were stable to varus/valgus and AP stress  The patella tracks centrally without crepitus  Patellar compression was negative today  Sensation was intact to light touch and good capillary refill at all toes  Distal pulses were intact    There is no calf tenderness, popliteal adenopathy or cellulitis noted  Studies reviewed:  X-ray report of nonweightbearing right knee x-rays, not the films themselves, from [facility] from 11/18/2019 were negative for fracture, dislocation or degenerative change  No soft tissue calcification was reported       Large joint arthrocentesis: R anserine bursa  Date/Time: 1/21/2020 11:18 AM  Site marked: site marked  Supporting Documentation  Indications: pain   Procedure Details  Location: knee - R anserine bursa  Preparation: Patient was prepped and draped in the usual sterile fashion  Needle size: 25 G  Ultrasound guidance: no  Approach: anteromedial  Medications administered: 3 mL lidocaine 1 %; 6 mg betamethasone acetate-betamethasone sodium phosphate 6 (3-3) mg/mL    Patient tolerance: patient tolerated the procedure well with no immediate complications  Dressing:  Sterile dressing applied            Scribe Attestation    I,:   Cristina Guaman MA am acting as a scribe while in the presence of the attending physician :        I,:   Amrita Fontana MD personally performed the services described in this documentation    as scribed in my presence :

## 2020-01-21 NOTE — PATIENT INSTRUCTIONS
Plan :  This patient has definite bursitis where the medial hamstrings insert into the tibia (pes anserine bursa)  I will treat this locally with cortisone injection to decrease her symptoms  I did try to add some gentle hamstring stretching to stretch out these muscles, but it is not clear to me whether this is an isolated problem or in fact is referred pain coming from her back  I made it clear to her that if the exercises make her back pain worse, then she has to stop the exercises, because we do not want to make one area better at the price of the other  She can put ice on the area in a small trash bag or bag of frozen peas for 15 minutes 4 times a day if needed and can take Advil, Aleve, or Tylenol if needed  I will see her back again here in 2 months for clinical re-evaluation and routine follow-up

## 2020-02-11 ENCOUNTER — HOSPITAL ENCOUNTER (OUTPATIENT)
Dept: RADIOLOGY | Facility: HOSPITAL | Age: 55
Discharge: HOME | End: 2020-02-11
Attending: ORTHOPAEDIC SURGERY
Payer: COMMERCIAL

## 2020-02-11 ENCOUNTER — TELEPHONE (OUTPATIENT)
Dept: OBGYN CLINIC | Facility: CLINIC | Age: 55
End: 2020-02-11

## 2020-02-11 ENCOUNTER — OFFICE VISIT (OUTPATIENT)
Dept: OBGYN CLINIC | Facility: CLINIC | Age: 55
End: 2020-02-11
Payer: COMMERCIAL

## 2020-02-11 VITALS
HEIGHT: 59 IN | SYSTOLIC BLOOD PRESSURE: 153 MMHG | HEART RATE: 80 BPM | WEIGHT: 160 LBS | DIASTOLIC BLOOD PRESSURE: 88 MMHG | BODY MASS INDEX: 32.25 KG/M2

## 2020-02-11 DIAGNOSIS — M25.561 ACUTE PAIN OF RIGHT KNEE: ICD-10-CM

## 2020-02-11 DIAGNOSIS — Z01.89 ENCOUNTER FOR LOWER EXTREMITY COMPARISON IMAGING STUDY: ICD-10-CM

## 2020-02-11 DIAGNOSIS — M17.11 PRIMARY OSTEOARTHRITIS OF RIGHT KNEE: Primary | ICD-10-CM

## 2020-02-11 PROCEDURE — 73564 X-RAY EXAM KNEE 4 OR MORE: CPT

## 2020-02-11 PROCEDURE — 73560 X-RAY EXAM OF KNEE 1 OR 2: CPT

## 2020-02-11 PROCEDURE — 99213 OFFICE O/P EST LOW 20 MIN: CPT | Performed by: ORTHOPAEDIC SURGERY

## 2020-02-11 PROCEDURE — 20610 DRAIN/INJ JOINT/BURSA W/O US: CPT | Performed by: ORTHOPAEDIC SURGERY

## 2020-02-11 RX ORDER — METHYLPREDNISOLONE ACETATE 40 MG/ML
1 INJECTION, SUSPENSION INTRA-ARTICULAR; INTRALESIONAL; INTRAMUSCULAR; SOFT TISSUE
Status: COMPLETED | OUTPATIENT
Start: 2020-02-11 | End: 2020-02-11

## 2020-02-11 RX ORDER — LIDOCAINE HYDROCHLORIDE 10 MG/ML
4 INJECTION, SOLUTION INFILTRATION; PERINEURAL
Status: COMPLETED | OUTPATIENT
Start: 2020-02-11 | End: 2020-02-11

## 2020-02-11 RX ADMIN — LIDOCAINE HYDROCHLORIDE 4 ML: 10 INJECTION, SOLUTION INFILTRATION; PERINEURAL at 14:43

## 2020-02-11 RX ADMIN — METHYLPREDNISOLONE ACETATE 1 ML: 40 INJECTION, SUSPENSION INTRA-ARTICULAR; INTRALESIONAL; INTRAMUSCULAR; SOFT TISSUE at 14:43

## 2020-02-11 NOTE — PATIENT INSTRUCTIONS
1  I explained the natural history of the  problem to the  patient - there is no surgical intervention necessary and should improve with nonoperative treatment  2  Activity modification - avoid hyperflexion like bending, kneeling, squatting,  stairs as  much as humanly possible  Change position frequently to straighten your leg if you are sitting for a long period of time  3  Pain should be the warning guide to your activities - both during and after exercises  Stop doing your sport or activity if you are getting significant pain  4  Ice in large trash bag or bag of frozen peas for 15 min 4x a day, if needed, for pain or swelling  Heat is not indicated  5  Take Advil, Aleve, or Tylenol on an as-needed basis for pain  6  Do the isometric quad and hamstring exercises that you were shown diligently at  home  7  Gliding activities (skiing machine, elliptical, swimming) are allowed, but do not do pounding activities  ( treadmill, aerobics, distance walking )     8  I injected her right knee joint through an inferolateral portal with lidocaine and Depo-Medrol to decrease pain and inflammation

## 2020-02-11 NOTE — TELEPHONE ENCOUNTER
Spoke to patient  She stated she gets a lot of pain in the knee and is having difficulty ambulating  Stated she would like to be seen for this issue  Spoke to Marianne Coronado in [facility] and advised today appointment was acceptable  Patient added to the schedule for this afternoon

## 2020-02-11 NOTE — TELEPHONE ENCOUNTER
Dr Evans   #: [phone]           Patient is calling in requesting a call back  She received a steroid injection on 1/21 on R knee  She states she is experiencing a lot of pain and discomfort  She can barely walk  Patient will like to know what can she do?  Please advise, thank you

## 2020-02-11 NOTE — PROGRESS NOTES
Chief Complaint   Patient presents with    Right Knee - Follow-up           Assessment:  Medial compartment arthritis right knee    Plan :  1  I explained the natural history of the  problem to the  patient - there is no surgical intervention necessary and should improve with nonoperative treatment  2  Activity modification - avoid hyperflexion like bending, kneeling, squatting,  stairs as  much as humanly possible  Change position frequently to straighten your leg if you are sitting for a long period of time  3  Pain should be the warning guide to your activities - both during and after exercises  Stop doing your sport or activity if you are getting significant pain  4  Ice in large trash bag or bag of frozen peas for 15 min 4x a day, if needed, for pain or swelling  Heat is not indicated  5  Take Advil, Aleve, or Tylenol on an as-needed basis for pain  6  Do the isometric quad and hamstring exercises that you were shown diligently at  home  7  Gliding activities (skiing machine, elliptical, swimming) are allowed, but do not do pounding activities  ( treadmill, aerobics, distance walking )  8  I injected her right knee joint through an inferolateral portal with lidocaine and Depo-Medrol to decrease pain and inflammation     HPI:   This is a 48year-old [facility] American female presenting today for orthopedic consultation, sent by her pain management physician  This is regarding her right knee pain  She has a long history from her previous residence in [place] where she talks about 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  She was treated between 2 different physicians and was ultimately asked to resign her job  She states she was going to the emergency room on multiple occasions after work due to her excessive pain  She is discharged and she has been told that her knee pain is not related to her back    She reports that she had an MRI done that revealed a spondylolisthesis  She has received injections in her back which choir her back symptoms been on her knee  She has also been attending physical therapy for about a month for both the back and knee  She obtained ibuprofen 100 mg from a friend which does quiet her symptoms but she is hesitant to take these long term  She has a history of anxiety and depression  Patient returns today on 2/11/2020 for follow-up evaluation  She reports that she had approximately 2 days of relief from the cortisone injection, but her symptoms began to return shortly thereafter  She continues to complain of pain when walking down stairs, and occasionally feels as though her knee is going to give out on her  She denies any bruising, swelling, numbness, or tingling  She maintains that she then consistent with the home exercises provided at her previous visit  PE:  /88   Pulse 80   Ht 4' 11 25" (1 505 m)   Wt 72 6 kg (160 lb)   BMI 32 04 kg/m²       Ortho Exam:    Examination of the right knee shows no obvious anatomical deformity  Skin is warm and dry to touch with no signs of erythema, ecchymosis, or infection  She is able to demonstrate active knee ROM 0°-90°, which is considerably less than a full 120 degree arc of motion observed on the left lower extremity  Patient is exquisitely tender to palpation over the pes anserine bursa and medial joint line  She has no tenderness to palpation over the lateral joint line or popliteal fossa  There is no popliteal adenopathy noted on exam   Knee is stable to varus, valgus, anterior, and posterior stress  Negative calf squeeze  2+ DP pulse  Studies reviewed:  X-ray report of nonweightbearing right knee x-rays, not the films themselves, from [facility] from 11/18/2019 were negative for fracture, dislocation or degenerative change  No soft tissue calcification was reported    New weight-bearing knee x-rays done on 02/11/2020 were personally reviewed and showed significant narrowing the medial compartment the right knee compared to the left  There is some minimal lateral facet narrowing and spurring of the lateral femoral condyle  No  loose bodies, lytic areas or soft tissue calcification is seen     Large joint arthrocentesis: R knee  Date/Time: 2/11/2020 2:43 PM  Consent given by: patient  Site marked: site marked  Supporting Documentation  Indications: pain   Procedure Details  Location: knee - R knee  Preparation: Patient was prepped and draped in the usual sterile fashion  Needle size: 22 G  Ultrasound guidance: no  Approach: Inferior lateral   Medications administered: 4 mL lidocaine 1 %; 1 mL methylPREDNISolone acetate 40 mg/mL    Patient tolerance: patient tolerated the procedure well with no immediate complications  Dressing:  Sterile dressing applied            Scribe Attestation    I,:   Bobby May am acting as a scribe while in the presence of the attending physician :        I,:   Carmel Feliz MD personally performed the services described in this documentation    as scribed in my presence :

## 2020-03-03 ENCOUNTER — HOSPITAL ENCOUNTER (EMERGENCY)
Facility: HOSPITAL | Age: 55
Discharge: HOME | End: 2020-03-03
Attending: EMERGENCY MEDICINE
Payer: COMMERCIAL

## 2020-03-03 ENCOUNTER — APPOINTMENT (EMERGENCY)
Dept: RADIOLOGY | Facility: HOSPITAL | Age: 55
End: 2020-03-03
Payer: COMMERCIAL

## 2020-03-03 VITALS
RESPIRATION RATE: 16 BRPM | HEART RATE: 89 BPM | SYSTOLIC BLOOD PRESSURE: 153 MMHG | WEIGHT: 162.7 LBS | TEMPERATURE: 98.2 F | DIASTOLIC BLOOD PRESSURE: 84 MMHG | OXYGEN SATURATION: 99 % | BODY MASS INDEX: 32.58 KG/M2

## 2020-03-03 DIAGNOSIS — M54.2 NECK PAIN ON RIGHT SIDE: ICD-10-CM

## 2020-03-03 DIAGNOSIS — M54.50 LOW BACK PAIN: ICD-10-CM

## 2020-03-03 DIAGNOSIS — M17.11 OSTEOARTHRITIS OF RIGHT KNEE: Primary | ICD-10-CM

## 2020-03-03 PROCEDURE — 72040 X-RAY EXAM NECK SPINE 2-3 VW: CPT

## 2020-03-03 PROCEDURE — 73030 X-RAY EXAM OF SHOULDER: CPT

## 2020-03-03 PROCEDURE — 99283 EMERGENCY DEPT VISIT LOW MDM: CPT

## 2020-03-03 PROCEDURE — 99284 EMERGENCY DEPT VISIT MOD MDM: CPT | Performed by: PHYSICIAN ASSISTANT

## 2020-03-03 PROCEDURE — 96372 THER/PROPH/DIAG INJ SC/IM: CPT

## 2020-03-03 RX ORDER — METHOCARBAMOL 500 MG/1
500 TABLET, FILM COATED ORAL ONCE
Status: COMPLETED | OUTPATIENT
Start: 2020-03-03 | End: 2020-03-03

## 2020-03-03 RX ORDER — METHOCARBAMOL 500 MG/1
500 TABLET, FILM COATED ORAL 2 TIMES DAILY PRN
Qty: 20 TABLET | Refills: 0 | Status: SHIPPED | OUTPATIENT
Start: 2020-03-03 | End: 2020-03-03 | Stop reason: SDUPTHER

## 2020-03-03 RX ORDER — METHOCARBAMOL 500 MG/1
500 TABLET, FILM COATED ORAL 2 TIMES DAILY PRN
Qty: 20 TABLET | Refills: 0 | Status: SHIPPED | OUTPATIENT
Start: 2020-03-03

## 2020-03-03 RX ORDER — KETOROLAC TROMETHAMINE 30 MG/ML
15 INJECTION, SOLUTION INTRAMUSCULAR; INTRAVENOUS ONCE
Status: COMPLETED | OUTPATIENT
Start: 2020-03-03 | End: 2020-03-03

## 2020-03-03 RX ADMIN — METHOCARBAMOL TABLETS 500 MG: 500 TABLET, COATED ORAL at 15:30

## 2020-03-03 RX ADMIN — KETOROLAC TROMETHAMINE 15 MG: 30 INJECTION, SOLUTION INTRAMUSCULAR at 15:33

## 2020-03-03 NOTE — DISCHARGE INSTRUCTIONS
Please refer to the attached information for strict return instructions  If symptoms worsen or new symptoms develop please return to the ER  Please follow up with orthopedics

## 2020-03-04 NOTE — ED PROVIDER NOTES
History  Chief Complaint   Patient presents with    Knee Pain     Patient c/o chronic R knee pain due to arthritis, reports she is having difficulty walking recently due to the pain  Francia Rhoades is a 49 yo F, with past medical history of right knee osteoarthritis, presenting with persistent right knee pain with weight-bearing/ambulation which has been worse over the past several days  Patient does have prior x-rays revealing joint space narrowing/osteoarthritic changes and right knee  Denies recent fall, injury, or trauma  Patient also reports right lower back pain ongoing for several months which she believes is contributing to her knee pain  Patient additionally reports right-sided neck and shoulder pain and decreased range of motion to right shoulder  No numbness, tingling, weakness  Denies leg swelling  Denies neck stiffness, headache, fevers, or chills  Patient has been taking ibuprofen as needed at home without significant relief  History provided by:  Patient   used: No    Knee Pain   Location:  Knee  Injury: no    Knee location:  R knee  Pain details:     Timing:  Constant    Progression:  Waxing and waning  Prior injury to area:  No  Relieved by:  Nothing  Worsened by:  Bearing weight  Ineffective treatments:  NSAIDs  Associated symptoms: neck pain    Associated symptoms: no back pain, no fever, no numbness, no swelling and no tingling        Prior to Admission Medications   Prescriptions Last Dose Informant Patient Reported? Taking?    DULoxetine HCl 30 MG CSDR   Yes No   Sig: duloxetine 30 mg capsule,delayed release   albuterol (PROVENTIL HFA,VENTOLIN HFA) 90 mcg/act inhaler   Yes No   Sig: albuterol sulfate HFA 90 mcg/actuation aerosol inhaler   clotrimazole 1 % external solution   Yes No   Sig: clotrimazole 1 % topical solution   fluticasone (FLOVENT HFA) 110 MCG/ACT inhaler   Yes No   Sig: Flovent  mcg/actuation aerosol inhaler   ibuprofen (MOTRIN) 800 mg tablet Yes No   Sig: ibuprofen 800 mg tablet   oxyCODONE-acetaminophen (PERCOCET) 5-325 mg per tablet   Yes No   Sig: oxycodone-acetaminophen 5 mg-325 mg tablet   tiZANidine (ZANAFLEX) 4 mg tablet   Yes No   Sig: tizanidine 4 mg tablet      Facility-Administered Medications: None       Past Medical History:   Diagnosis Date    Anxiety     Arthritis     hands    Asthma     Depression        Past Surgical History:   Procedure Laterality Date     SECTION         Family History   Family history unknown: Yes     I have reviewed and agree with the history as documented  E-Cigarette/Vaping    E-Cigarette Use Never User      E-Cigarette/Vaping Substances     Social History     Tobacco Use    Smoking status: Current Some Day Smoker     Packs/day: 1 00     Years: 30 00     Pack years: 30 00     Types: Cigarettes    Smokeless tobacco: Former User   Substance Use Topics    Alcohol use: Not Currently     Frequency: Never    Drug use: Never       Review of Systems   Constitutional: Negative for chills and fever  HENT: Negative for congestion, rhinorrhea and sore throat  Eyes: Negative for pain and visual disturbance  Respiratory: Negative for cough, shortness of breath and wheezing  Cardiovascular: Negative for chest pain and palpitations  Gastrointestinal: Negative for abdominal pain, nausea and vomiting  Genitourinary: Negative for dysuria, frequency and urgency  Musculoskeletal: Positive for arthralgias and neck pain  Negative for back pain, joint swelling and neck stiffness  Skin: Negative for rash and wound  Neurological: Negative for dizziness, weakness, light-headedness and numbness  Physical Exam  Physical Exam   Constitutional: She is oriented to person, place, and time  She appears well-developed and well-nourished  No distress  HENT:   Head: Normocephalic and atraumatic     Right Ear: External ear normal    Left Ear: External ear normal    Mouth/Throat: Oropharynx is clear and moist    Eyes: Pupils are equal, round, and reactive to light  Conjunctivae and EOM are normal    Neck: Normal range of motion  Neck supple  Cardiovascular: Normal rate, regular rhythm, normal heart sounds and intact distal pulses  Exam reveals no gallop and no friction rub  No murmur heard  Pulmonary/Chest: Effort normal and breath sounds normal  No respiratory distress  She has no wheezes  Abdominal: Soft  She exhibits no distension  There is no tenderness  Musculoskeletal: She exhibits tenderness  Tenderness to palpation to right cervical paraspinal musculature and right trapezius  Limited range of motion of right shoulder in flexion and abduction due to pain  No deformity noted to right shoulder  5/5 strength to bilateral upper extremities  Intact sensation to bilateral upper extremities  Right lower lumbar paraspinal tenderness  Limited range of motion of low back in flexion extension due to pain  Tenderness to palpation over anterior/medial right knee  Normal range of motion in flexion and extension  Negative Lachman's, negative anterior drawer, negative Nisha's  No MCL or LCL instability on exam     No lower extremity edema or calf tenderness  Negative Homans bilaterally  5/5 strength to bilateral lower extremities  Intact sensation to lower extremities  Negative straight leg raise bilaterally  Lymphadenopathy:     She has no cervical adenopathy  Neurological: She is alert and oriented to person, place, and time  She exhibits normal muscle tone  Coordination normal    Skin: Skin is warm and dry  Capillary refill takes less than 2 seconds  No rash noted  She is not diaphoretic  No erythema  Psychiatric: She has a normal mood and affect   Her behavior is normal  Judgment and thought content normal        Vital Signs  ED Triage Vitals [03/03/20 1459]   Temperature Pulse Respirations Blood Pressure SpO2   98 2 °F (36 8 °C) 89 16 153/84 99 %      Temp Source Heart Rate Source Patient Position - Orthostatic VS BP Location FiO2 (%)   Temporal Monitor Sitting Right arm --      Pain Score       Worst Possible Pain           Vitals:    03/03/20 1459   BP: 153/84   Pulse: 89   Patient Position - Orthostatic VS: Sitting         Visual Acuity      ED Medications  Medications   ketorolac (TORADOL) injection 15 mg (15 mg Intramuscular Given 3/3/20 1533)   methocarbamol (ROBAXIN) tablet 500 mg (500 mg Oral Given 3/3/20 1530)       Diagnostic Studies  Results Reviewed     None                 XR shoulder 2+ views RIGHT   Final Result by Rosita Walker MD (03/03 1616)      No acute osseous abnormality  Workstation performed: [id number]         XR spine cervical 2 or 3 vw injury   Final Result by Rosita Walker MD (03/03 1617)      Degenerative disc disease at C4-C5  Workstation performed: [id number]                    Procedures  Procedures         ED Course                               MDM  Number of Diagnoses or Management Options  Low back pain:   Neck pain on right side:   Osteoarthritis of right knee:   Diagnosis management comments: Right-sided neck and shoulder pain, no history of trauma  Muscular tenderness to palpation, limited range of motion of right shoulder  Will obtain x-ray cervical spine/right shoulder  Provide Toradol and Robaxin  Right lower back pain ongoing for months to years  No neurologic symptoms to lower extremities  Right-sided knee pain, prior history of x-ray revealing osteoarthritic changes  No recent injury or trauma  Pain likely due to arthritic changes  Will treat supportively  Follow-up with orthopedics is recommended  Return indications discussed prior to discharge         Amount and/or Complexity of Data Reviewed  Tests in the radiology section of CPT®: ordered    Patient Progress  Patient progress: stable        Disposition  Final diagnoses:   Osteoarthritis of right knee   Neck pain on right side   Low back pain     Time reflects when diagnosis was documented in both MDM as applicable and the Disposition within this note     Time User Action Codes Description Comment    3/3/2020  4:01 PM Keegan Blush Add [M17 11] Osteoarthritis of right knee     3/3/2020  4:02 PM Keegan Blush Add [M54 2] Neck pain on right side     3/3/2020  4:03 PM Keegan Blush Add [M54 5] Low back pain       ED Disposition     ED Disposition Condition Date/Time Comment    Discharge Stable Tue Mar 3, 2020  4:01 PM Phil Moses discharge to home/self care              Follow-up Information     Follow up With Specialties Details Why Contact Info Additional [address] [facility] Orthopedic Surgery Schedule an appointment as soon as possible for a visit   [address]  [id number]  [address] [place] [address], [address] [facility], [address] [place], [place], [address]   [facility] Emergency Department Emergency Medicine  If symptoms worsen [facility] [id number]  [place] [address] ED, [address] [place], [place], [address]          Discharge Medication List as of 3/3/2020  4:06 PM      START taking these medications    Details   diclofenac sodium (VOLTAREN) 1 % Apply 2 g topically 4 (four) times a day as needed (Pain), Starting Tue 3/3/2020, Print      methocarbamol (ROBAXIN) 500 mg tablet Take 1 tablet (500 mg total) by mouth 2 (two) times a day as needed for muscle spasms, Starting Tue 3/3/2020, Print         CONTINUE these medications which have NOT CHANGED    Details   albuterol (PROVENTIL HFA,VENTOLIN HFA) 90 mcg/act inhaler albuterol sulfate HFA 90 mcg/actuation aerosol inhaler, Historical Med      clotrimazole 1 % external solution clotrimazole 1 % topical solution, Historical Med      DULoxetine HCl 30 MG CSDR duloxetine 30 mg capsule,delayed release, Historical Med fluticasone (FLOVENT HFA) 110 MCG/ACT inhaler Flovent  mcg/actuation aerosol inhaler, Historical Med      ibuprofen (MOTRIN) 800 mg tablet ibuprofen 800 mg tablet, Historical Med      oxyCODONE-acetaminophen (PERCOCET) 5-325 mg per tablet oxycodone-acetaminophen 5 mg-325 mg tablet, Historical Med         STOP taking these medications       tiZANidine (ZANAFLEX) 4 mg tablet Comments:   Reason for Stopping:             No discharge procedures on file      PDMP Review     None          ED Provider  Electronically Signed by           Felipe Parham PA-C  03/03/20 8333

## 2020-03-19 ENCOUNTER — OFFICE VISIT (OUTPATIENT)
Dept: OBGYN CLINIC | Facility: CLINIC | Age: 55
End: 2020-03-19
Payer: COMMERCIAL

## 2020-03-19 VITALS
TEMPERATURE: 98.4 F | DIASTOLIC BLOOD PRESSURE: 87 MMHG | SYSTOLIC BLOOD PRESSURE: 142 MMHG | HEART RATE: 96 BPM | BODY MASS INDEX: 32.66 KG/M2 | WEIGHT: 162 LBS | HEIGHT: 59 IN

## 2020-03-19 DIAGNOSIS — S46.811A STRAIN OF RIGHT TRAPEZIUS MUSCLE, INITIAL ENCOUNTER: ICD-10-CM

## 2020-03-19 DIAGNOSIS — M17.11 PRIMARY OSTEOARTHRITIS OF RIGHT KNEE: Primary | ICD-10-CM

## 2020-03-19 PROCEDURE — 99213 OFFICE O/P EST LOW 20 MIN: CPT | Performed by: ORTHOPAEDIC SURGERY

## 2020-03-19 NOTE — PATIENT INSTRUCTIONS
Plan :  I am happy that her knee is feeling better- now she has had a flare-up of neck and right shoulder pain  I spent a long time going over the natural history of degenerative arthritis in neck, back, and knee  There is no cure for this arthritis and the secondary muscle spasm that follows is what we treat with exercise  I encouraged her not to go to the emergency room but to call her family doctor if she has a flare-up of pain for initial treatment and if not better, then she certainly is welcome to return to our orthopedic office for follow-up  She should always put ice on the painful area for an acute  flare-up for 15 minutes 4 times a day in a small trash bag or bag of frozen peas  She can take Advil (ibuprofen, Motrin), Aleve, or Tylenol if needed for pain  She should continue to avoid hyperflexion activities such as bending, kneeling, squatting, deep knee bends, excessive stairs, or sitting in one  position with her knee bent for a  prolonged period of time, as much as she can, to prevent knee pain  I did show her some gentle neck and right trapezius exercises I would like her to do once a day at home until her pain diminishes  She may do activities as tolerated, letting pain be the warning guide to her activity level    She can return to see us if she has any further problems

## 2020-03-19 NOTE — PROGRESS NOTES
Chief Complaint   Patient presents with    Right Knee - Follow-up           Assessment:  Medial compartment arthritis right knee                             Right trap muscle strain    Plan :  I am happy that her knee is feeling better- now she has had a flare-up of neck and right shoulder pain  I spent a long time going over the natural history of degenerative arthritis in neck, back, and knee  There is no cure for this arthritis and the secondary muscle spasm that follows is what we treat with exercise  I encouraged her not to go to the emergency room but to call her family doctor if she has a flare-up of pain for initial treatment and if not better, then she certainly is welcome to return to our orthopedic office for follow-up  She should always put ice on the painful area for an acute  flare-up for 15 minutes 4 times a day in a small trash bag or bag of frozen peas  She can take Advil (ibuprofen, Motrin), Aleve, or Tylenol if needed for pain  She should continue to avoid hyperflexion activities such as bending, kneeling, squatting, deep knee bends, excessive stairs, or sitting in one  position with her knee bent for a  prolonged period of time, as much as she can, to prevent knee pain  I did show her some gentle neck and right trapezius exercises I would like her to do once a day at home until her pain diminishes  She may do activities as tolerated, letting pain be the warning guide to her activity level  She can return to see us if she has any further problems    HPI:   This is a 48year-old [facility] American female presenting today for orthopedic consultation, sent by her pain management physician  This is regarding her right knee pain  She has a long history from her previous residence in [place] where she talks about 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  She was treated by 2 different physicians and was ultimately asked to resign her job    She states she was going to the emergency room on multiple occasions after work due to her excessive pain  She was discharged and she has been told that her knee pain is not related to her back  She reports that she had an MRI done of her back that revealed a spondylolisthesis  She has received injections in her back which quieted her back , but most symptoms have  been in her knee  She has also been attending physical therapy for about a month for both the back and knee  She obtained ibuprofen 100 mg from a friend which does quiet her symptoms but she is hesitant to take these long term  She has a history of anxiety and depression  Patient returns today on 2/11/2020 for follow-up evaluation  She reports that she had approximately 2 days of relief from the cortisone injection into the pes bursa, but her symptoms began to return shortly thereafter  She continues to complain of pain when walking down stairs, and occasionally feels as though her knee is going to give out on her  She denies any bruising, swelling, numbness, or tingling  She maintains that she then consistent with the home exercises provided at her previous visit  I injected her right knee joint proper with cortisone at that visit  She returns now on 03/09/2020  She is about 5 weeks after her last visit with cortisone injection to her knee for medial compartment arthritis  Since I saw her she went to the emergency room again for neck, right shoulder, and right knee pain on 03/03/2020  Her knee is actually doing better and her major complaint is pain a muscle spasm in her neck and right shoulder blade  She does not note any one injury that exacerbated her symptoms  The remainder of this patient's past medical history, family history, social history, medicines, and allergies was reviewed in the chart  Please see HPI for pertinent review of systems  All other systems reviewed are negative        PE:  /87   Pulse 96   Temp 98 4 °F (36 9 °C)   Ht 4' 11 25" (1 505 m)   Wt 73 5 kg (162 lb)   BMI 32 44 kg/m²     She was mildly heavy  She is not limping or using an any external aids  She had some mild varus positioning of her right knee upon weight-bearing but had no effusion, redness, or ecchymosis over this knee  The tenderness over the medial joint and pes bursa was definitely decreased compared to before  She had no lateral joint line or fibular head tenderness  Patellar compression test was negative  She had mild crepitus on knee flexion extension  Range of motion was good going from 0-115 degrees today  She again demonstrated no ligamentous laxity on AP or mediolateral stress testing of the knee  She had no popliteal or calf tenderness  She showed good motion, capillary refill, sensation in the toes of the right foot  She was tender over paracervical and right upper trapezius muscles, not so much the shoulder joint  She had pain on trap stretching  She showed good shoulder range of motion  There are no skin changes over her scapula noted either  Studies reviewed:  X-ray report of nonweightbearing right knee x-rays, not the films themselves, from [facility] from 11/18/2019 were negative for fracture, dislocation or degenerative change  No soft tissue calcification was reported  New weight-bearing knee x-rays done on 02/11/2020 were personally reviewed and showed significant narrowing the medial compartment the right knee compared to the left  There is some minimal lateral facet narrowing and spurring of the lateral femoral condyle  No  loose bodies, lytic areas or soft tissue calcification is seen     Procedures      Scribe Attestation    I,:    am acting as a scribe while in the presence of the attending physician :        I,:    personally performed the services described in this documentation    as scribed in my presence :

## 2021-06-02 ENCOUNTER — OFFICE VISIT (OUTPATIENT)
Dept: OBGYN CLINIC | Facility: MEDICAL CENTER | Age: 56
End: 2021-06-02
Payer: COMMERCIAL

## 2021-06-02 VITALS
WEIGHT: 157 LBS | BODY MASS INDEX: 32.95 KG/M2 | HEART RATE: 80 BPM | TEMPERATURE: 98 F | DIASTOLIC BLOOD PRESSURE: 89 MMHG | HEIGHT: 58 IN | SYSTOLIC BLOOD PRESSURE: 151 MMHG

## 2021-06-02 DIAGNOSIS — M17.12 PRIMARY OSTEOARTHRITIS OF LEFT KNEE: ICD-10-CM

## 2021-06-02 DIAGNOSIS — M25.562 CHRONIC PAIN OF LEFT KNEE: Primary | ICD-10-CM

## 2021-06-02 DIAGNOSIS — G89.29 CHRONIC PAIN OF LEFT KNEE: Primary | ICD-10-CM

## 2021-06-02 PROCEDURE — 20610 DRAIN/INJ JOINT/BURSA W/O US: CPT | Performed by: STUDENT IN AN ORGANIZED HEALTH CARE EDUCATION/TRAINING PROGRAM

## 2021-06-02 PROCEDURE — 99213 OFFICE O/P EST LOW 20 MIN: CPT | Performed by: STUDENT IN AN ORGANIZED HEALTH CARE EDUCATION/TRAINING PROGRAM

## 2021-06-02 NOTE — PROGRESS NOTES
1  Chronic pain of left knee  Ambulatory referral to Physical Therapy    Large joint arthrocentesis: L knee   2  Primary osteoarthritis of left knee  Ambulatory referral to Physical Therapy    Large joint arthrocentesis: L knee     Orders Placed This Encounter   Procedures    Large joint arthrocentesis: L knee    Ambulatory referral to Physical Therapy        Imaging Studies (I personally reviewed images in PACS and report):    Prior imagin  X-ray left knee 2020:  Mild osteoarthritic changes of the medial joint compartment  Small marginal hypertrophic spurs  Otherwise no acute osseous abnormalities  IMPRESSION:  Chronic left knee pain w/o precipitating injury  Primary OA flare (DDx: degenerative meniscal tear)    Other factors: BMI 33    PLAN:  The patient has an examination consistent with OA pain of the left knee  I have discussed with the patient the pathophysiology of this diagnosis and reviewed how the examination correlates with this diagnosis  Treatment options were discussed at length and after discussing these treatment options, the patient elected for and received a left knee injection of corticosteroid (as described in the procedure note) with a prescription for referral to physical therapy  I counseled she can continue to use topical/ oral NSAIDs and/or acetaminophen while cortisone takes effect  We will reevaluate the patient in 6-8 weeks  Return in about 6 weeks (around 2021)    If no improvement on following visit, I may consider viscosupplementation injection of her left knee and/or MRI of the left knee without contrast         CHIEF COMPLAINT:  Left knee pain    HPI:  Dl Tamayo is a 49 y o  female  who presents for       Visit 2021 :  Initial evaluation of bilateral knee pain, left greater than right:  Of note, patient has a history of bilateral knee osteoarthritis and previously been seen Dr Parker Casillas knee which given her right knee cortisone injection which she reports provided significant relief  She reports today that over the past few months she has had worsening left knee pain without a precipitating injury  Pain is located over the anteromedial aspect of the knee and is described as a dull /sharp pain, nonradiating, moderate intensity  Pain is aggravated with activity, especially ascending and descending stairs and getting out of the car  She also reports intermittent swelling of her left knee  She reports clicking/popping of her knees bilaterally, but this is a chronic issue  In regards to treatments she has been using Tylenol, naproxen without significant relief  She is interested in an injection of her knee today if possible  She denies left knee locking in extension, giving out, numbness/ tingling, skin color changes, and other ROS as per below  She is also interested in attending formal physical therapy to prevent knee pain in the future to minimize injections of her knees  Review of Systems   Constitutional: Negative for chills, fatigue and fever  Respiratory: Negative for cough and shortness of breath  Gastrointestinal: Negative for abdominal pain, nausea and vomiting  Musculoskeletal:        As per HPI   Skin: Negative for rash and wound  Neurological:        As per HPI         Medical, Surgical, Family, and Social History    Past Medical History:   Diagnosis Date    Anxiety     Arthritis     hands    Asthma     Depression      Past Surgical History:   Procedure Laterality Date     SECTION       Social History   Social History     Substance and Sexual Activity   Alcohol Use Not Currently    Frequency: Never     Social History     Substance and Sexual Activity   Drug Use Never     Social History     Tobacco Use   Smoking Status Current Some Day Smoker    Packs/day:  00    Years:     Pack years:     Types: Cigarettes   Smokeless Tobacco Never Used     Family History   Family history unknown:  Yes No Known Allergies       Physical Exam  /89   Pulse 80   Temp 98 °F (36 7 °C)   Ht 4' 10" (1 473 m)   Wt 71 2 kg (157 lb)   BMI 32 81 kg/m²     Constitutional:  see vital signs  Gen: well-developed, normocephalic/atraumatic, well-groomed  Eyes: No inflammation or discharge of conjunctiva or lids; sclera clear   Pharynx: no inflammation, lesion, or mass of lips  Neck: supple, no masses, non-distended  MSK: no inflammation, lesion, mass, or clubbing of nails and digits except for other than mentioned below  SKIN: no visible rashes or skin lesions  Pulmonary/Chest: Effort normal  No respiratory distress  NEURO: cranial nerves grossly intact  PSYCH:  Alert and oriented to person, place, and time; recent and remote memory intact; mood normal, no depression, anxiety, or agitation, judgment and insight good and intact     Ortho Exam  Left Knee Exam:  Erythema: no  Swelling: no  Increased Warmth: no  Tenderness: +MJL  ROM: 0-120  Patellar Apprehension: negative  Patellar Grind: +  Lachman's: negative  Anterior Drawer: negative  Posterior Drawer:  Varus laxity: negative  Valgus laxity: negative  Jose F: +pain of medial meniscus without clicking  Sensation intact to light touch        Large joint arthrocentesis: L knee  Universal Protocol:  Consent: Verbal consent obtained  Risks and benefits: risks, benefits and alternatives were discussed  Consent given by: patient  Time out: Immediately prior to procedure a "time out" was called to verify the correct patient, procedure, equipment, support staff and site/side marked as required  Timeout called at: 6/2/2021 12:10 PM   Patient understanding: patient states understanding of the procedure being performed  Site marked: the operative site was marked  Radiology Images displayed and confirmed   If images not available, report reviewed: imaging studies available  Patient identity confirmed: verbally with patient    Supporting Documentation  Indications: pain Procedure Details  Location: knee - L knee  Preparation: Patient was prepped and draped in the usual sterile fashion  Needle size: 22 G  Ultrasound guidance: no  Approach: anterolateral  Medications administered: 4 mL lidocaine 1 %; 40 mg triamcinolone acetonide 40 mg/mL    Patient tolerance: patient tolerated the procedure well with no immediate complications  Dressing:  Sterile dressing applied

## 2021-06-02 NOTE — PATIENT INSTRUCTIONS

## 2021-06-08 RX ORDER — LIDOCAINE HYDROCHLORIDE 10 MG/ML
4 INJECTION, SOLUTION INFILTRATION; PERINEURAL
Status: COMPLETED | OUTPATIENT
Start: 2021-06-08 | End: 2021-06-08

## 2021-06-08 RX ORDER — TRIAMCINOLONE ACETONIDE 40 MG/ML
40 INJECTION, SUSPENSION INTRA-ARTICULAR; INTRAMUSCULAR
Status: COMPLETED | OUTPATIENT
Start: 2021-06-08 | End: 2021-06-08

## 2021-06-08 RX ADMIN — TRIAMCINOLONE ACETONIDE 40 MG: 40 INJECTION, SUSPENSION INTRA-ARTICULAR; INTRAMUSCULAR at 17:43

## 2021-06-08 RX ADMIN — LIDOCAINE HYDROCHLORIDE 4 ML: 10 INJECTION, SOLUTION INFILTRATION; PERINEURAL at 17:43

## 2021-07-14 ENCOUNTER — OFFICE VISIT (OUTPATIENT)
Dept: OBGYN CLINIC | Facility: MEDICAL CENTER | Age: 56
End: 2021-07-14
Payer: COMMERCIAL

## 2021-07-14 VITALS
BODY MASS INDEX: 32.54 KG/M2 | SYSTOLIC BLOOD PRESSURE: 118 MMHG | HEIGHT: 58 IN | DIASTOLIC BLOOD PRESSURE: 75 MMHG | HEART RATE: 93 BPM | WEIGHT: 155 LBS

## 2021-07-14 DIAGNOSIS — M54.50 CHRONIC BILATERAL LOW BACK PAIN WITHOUT SCIATICA: ICD-10-CM

## 2021-07-14 DIAGNOSIS — M25.551 GREATER TROCHANTERIC PAIN SYNDROME OF BOTH LOWER EXTREMITIES: ICD-10-CM

## 2021-07-14 DIAGNOSIS — G89.29 CHRONIC BILATERAL LOW BACK PAIN WITHOUT SCIATICA: ICD-10-CM

## 2021-07-14 DIAGNOSIS — G89.29 CHRONIC PAIN OF LEFT KNEE: Primary | ICD-10-CM

## 2021-07-14 DIAGNOSIS — M47.816 LUMBAR SPONDYLOSIS: ICD-10-CM

## 2021-07-14 DIAGNOSIS — M25.552 GREATER TROCHANTERIC PAIN SYNDROME OF BOTH LOWER EXTREMITIES: ICD-10-CM

## 2021-07-14 DIAGNOSIS — M17.12 PRIMARY OSTEOARTHRITIS OF LEFT KNEE: ICD-10-CM

## 2021-07-14 DIAGNOSIS — M25.562 CHRONIC PAIN OF LEFT KNEE: Primary | ICD-10-CM

## 2021-07-14 PROCEDURE — 99214 OFFICE O/P EST MOD 30 MIN: CPT | Performed by: STUDENT IN AN ORGANIZED HEALTH CARE EDUCATION/TRAINING PROGRAM

## 2021-07-14 RX ORDER — NAPROXEN 500 MG/1
500 TABLET ORAL 2 TIMES DAILY WITH MEALS
Qty: 60 TABLET | Refills: 0 | Status: SHIPPED | OUTPATIENT
Start: 2021-07-14

## 2021-07-14 NOTE — PROGRESS NOTES
1  Chronic pain of left knee  Ambulatory referral to Physical Therapy    naproxen (NAPROSYN) 500 mg tablet   2  Primary osteoarthritis of left knee  Ambulatory referral to Physical Therapy    naproxen (NAPROSYN) 500 mg tablet   3  Chronic bilateral low back pain without sciatica  Ambulatory referral to Physical Therapy    naproxen (NAPROSYN) 500 mg tablet   4  Lumbar spondylosis  Ambulatory referral to Physical Therapy    naproxen (NAPROSYN) 500 mg tablet   5  Greater trochanteric pain syndrome of both lower extremities  Ambulatory referral to Physical Therapy    naproxen (NAPROSYN) 500 mg tablet     Orders Placed This Encounter   Procedures    Ambulatory referral to Physical Therapy        Imaging Studies (I personally reviewed images in PACS and report):    Prior imagin  X-ray lumbar spine 2021 from [facility]:  I am unable to view images for this study but it was reported as multilevel mild spondylosis  IMPRESSION:  Chronic lumbar pain without sciatica  Lumbar spndylosis  Chronic left knee pain secondary to primary left knee OA - improved s/p cortisone injection    Other factors: BMI 32, tobacco dependence    PLAN:  I have discussed with the patient the pathophysiology of this diagnosis and reviewed how the examination correlates with this diagnosis  I discussed treatment in regards to her chronic low back pain as well as her left knee pain from osteoarthritis are typically treated conservatively  As she has improving pain symptoms from a cortisone injection given last visit, I recommended starting formal physical therapy which she was agreeable  I placed referral to physical therapy at this time  In addition, I have prescribed her naproxen 500 mg p o  b i d  p r n  knee pain  I counseled her not to concurrently take other NSAIDs with this medication but she can take acetaminophen  Return in about 6 weeks (around 2021)        CHIEF COMPLAINT:  Follow up left knee pain, new complaint of back pain    HPI:  Thelma Hilario is a 49 y o  female  who presents for       Visit 7/14/2021 : Follow up left knee pain secondary to OA:   Improved after cortisone injection at last visit  She reports improvements include decreased intensity of pain and decreased frequency of pain  She reports that she is able to walk further than she used to without difficulty  She has noticed that her back and left hip past discomfort when walking but states once she takes a break, her pain improves  In regards to pain control, she sparingly takes naproxen as well as turmeric which he feels provides some relief  She reports her left knee does not swell as often  She denies left knee redness, bruising, numbness/ tingling, giving out, locking in extension  She does report clicking/popping of her left knee, but this is a chronic issue  She is satisfied with her progress thus far and does not wish to pursue viscosupplementation injections for her knee  Additionally, she wishes to address her low back pain that has been ongoing for the past few months without a precipitating injury  She describes the pain as aching, midline and paralumbar  She states pain does not radiate down her legs  Describes the pain as intermittent, mild to moderate intensity  She denies lower extremity numbness/ tingling, weakness, bowel/ bladder incontinence, and other ROS as per below  She denies any unintentional weight loss, fevers/chills, nighttime diaphoresis  She has never seen formal physical therapy for her back in the past   She is interested in attending now that her left knee pain has improved  She has prior imaging of her lumbar spine from [facility] as noted above  Review of Systems   Constitutional: Negative for chills, fatigue, fever and unexpected weight change  HENT: Negative for sore throat  Respiratory: Negative for cough and shortness of breath      Gastrointestinal: Negative for abdominal pain, nausea and vomiting  Denies bowel incontinence   Genitourinary:        Denies urinary incontinence   Musculoskeletal:        As per HPI   Skin: Negative for rash and wound  Neurological:        As per HPI         Medical, Surgical, Family, and Social History    Past Medical History:   Diagnosis Date    Anxiety     Arthritis     hands    Asthma     Depression      Past Surgical History:   Procedure Laterality Date     SECTION       Social History   Social History     Substance and Sexual Activity   Alcohol Use Not Currently     Social History     Substance and Sexual Activity   Drug Use Never     Social History     Tobacco Use   Smoking Status Current Some Day Smoker    Packs/day: 1 00    Years: 30 00    Pack years: 30 00    Types: Cigarettes   Smokeless Tobacco Never Used     Family History   Family history unknown: Yes     No Known Allergies       Physical Exam  /75   Pulse 93   Ht 4' 10" (1 473 m)   Wt 70 3 kg (155 lb)   BMI 32 40 kg/m²     Constitutional:  see vital signs  Gen: obese (BMI 32), normocephalic/atraumatic, well-groomed  Eyes: No inflammation or discharge of conjunctiva or lids; sclera clear   Pharynx: no inflammation, lesion, or mass of lips  Neck: supple, no masses, non-distended  MSK: no inflammation, lesion, mass, or clubbing of nails and digits except for other than mentioned below  SKIN: no visible rashes or skin lesions  Pulmonary/Chest: Effort normal  No respiratory distress     NEURO: cranial nerves grossly intact  PSYCH:  Alert and oriented to person, place, and time; recent and remote memory intact; mood normal, no depression, anxiety, or agitation, judgment and insight good and intact     Ortho Exam  BACK EXAM:  Gait: normal, no trendelenberg gait, no antalgic gait    BACK TENDERNESS:  Spinous Processes: +L4, +L5  Paraspinal Muscles: +b/l paralumbar  SI Joint: no  Sacrum: no    ROM:  Flexion: 90 , aggravates pain  Extension: 20, aggravates pain  Lateral flexion: 10 b/l, aggravates pain  Rotation: 10 b/l, aggravates pain    DERMATOMAL SENSATION:  L1: normal   L2: normal   L3: normal   L4: normal   L5: normal   S1: normal    STRENGTH (bilateral):  Knee Extension: 5-/5  Knee Flexion: 5-/5  Foot Dorsiflexion: 5/5  Great Toe Extension: 5/5  Foot Plantarflexion: 5-/5  Hip Flexion: 4+/5    REFLEXES:  Patellar: 2+ bilateral  Achilles: 2+ bilateral  Clonus: negative bilateral    BACK:   SUPINE STRAIGHT LEG: negative      Left Knee Exam:  Erythema: no  Swelling: no  Increased Warmth: no  Tenderness: none  ROM: 0-130  Patellar Apprehension: negative  Patellar Grind: +  Lachman's: negative  Anterior Drawer: negative  Varus laxity: negative  Valgus laxity: negative  [facility]: negative

## 2021-10-05 ENCOUNTER — OFFICE VISIT (OUTPATIENT)
Dept: OBGYN CLINIC | Facility: MEDICAL CENTER | Age: 56
End: 2021-10-05
Payer: COMMERCIAL

## 2021-10-05 VITALS
HEIGHT: 58 IN | BODY MASS INDEX: 32.54 KG/M2 | WEIGHT: 155 LBS | HEART RATE: 74 BPM | SYSTOLIC BLOOD PRESSURE: 153 MMHG | DIASTOLIC BLOOD PRESSURE: 92 MMHG

## 2021-10-05 DIAGNOSIS — G89.29 CHRONIC PAIN OF LEFT KNEE: Primary | ICD-10-CM

## 2021-10-05 DIAGNOSIS — M17.12 PRIMARY OSTEOARTHRITIS OF LEFT KNEE: ICD-10-CM

## 2021-10-05 DIAGNOSIS — M25.562 CHRONIC PAIN OF LEFT KNEE: Primary | ICD-10-CM

## 2021-10-05 PROCEDURE — 99213 OFFICE O/P EST LOW 20 MIN: CPT | Performed by: STUDENT IN AN ORGANIZED HEALTH CARE EDUCATION/TRAINING PROGRAM

## 2021-10-05 PROCEDURE — 20610 DRAIN/INJ JOINT/BURSA W/O US: CPT | Performed by: STUDENT IN AN ORGANIZED HEALTH CARE EDUCATION/TRAINING PROGRAM

## 2021-10-05 RX ORDER — TRIAMCINOLONE ACETONIDE 40 MG/ML
40 INJECTION, SUSPENSION INTRA-ARTICULAR; INTRAMUSCULAR
Status: COMPLETED | OUTPATIENT
Start: 2021-10-05 | End: 2021-10-05

## 2021-10-05 RX ORDER — PREGABALIN 50 MG/1
50 CAPSULE ORAL DAILY
COMMUNITY
Start: 2021-08-23

## 2021-10-05 RX ORDER — LIDOCAINE HYDROCHLORIDE 10 MG/ML
4 INJECTION, SOLUTION INFILTRATION; PERINEURAL
Status: COMPLETED | OUTPATIENT
Start: 2021-10-05 | End: 2021-10-05

## 2021-10-05 RX ORDER — CELECOXIB 200 MG/1
CAPSULE ORAL
COMMUNITY
Start: 2021-09-08

## 2021-10-05 RX ORDER — OXYCODONE HYDROCHLORIDE 5 MG/1
TABLET ORAL
COMMUNITY
Start: 2021-09-22

## 2021-10-05 RX ADMIN — LIDOCAINE HYDROCHLORIDE 4 ML: 10 INJECTION, SOLUTION INFILTRATION; PERINEURAL at 13:26

## 2021-10-05 RX ADMIN — TRIAMCINOLONE ACETONIDE 40 MG: 40 INJECTION, SUSPENSION INTRA-ARTICULAR; INTRAMUSCULAR at 13:26

## 2021-11-19 ENCOUNTER — OFFICE VISIT (OUTPATIENT)
Dept: OBGYN CLINIC | Facility: MEDICAL CENTER | Age: 56
End: 2021-11-19
Payer: COMMERCIAL

## 2021-11-19 VITALS
DIASTOLIC BLOOD PRESSURE: 85 MMHG | HEART RATE: 76 BPM | SYSTOLIC BLOOD PRESSURE: 147 MMHG | WEIGHT: 155 LBS | HEIGHT: 58 IN | BODY MASS INDEX: 32.54 KG/M2

## 2021-11-19 DIAGNOSIS — M17.12 PRIMARY OSTEOARTHRITIS OF LEFT KNEE: ICD-10-CM

## 2021-11-19 DIAGNOSIS — M25.562 CHRONIC PAIN OF LEFT KNEE: Primary | ICD-10-CM

## 2021-11-19 DIAGNOSIS — G89.29 CHRONIC PAIN OF LEFT KNEE: Primary | ICD-10-CM

## 2021-11-19 PROCEDURE — 99213 OFFICE O/P EST LOW 20 MIN: CPT | Performed by: STUDENT IN AN ORGANIZED HEALTH CARE EDUCATION/TRAINING PROGRAM

## 2021-12-21 ENCOUNTER — PROCEDURE VISIT (OUTPATIENT)
Dept: OBGYN CLINIC | Facility: MEDICAL CENTER | Age: 56
End: 2021-12-21
Payer: COMMERCIAL

## 2021-12-21 VITALS
SYSTOLIC BLOOD PRESSURE: 155 MMHG | DIASTOLIC BLOOD PRESSURE: 81 MMHG | HEIGHT: 59 IN | BODY MASS INDEX: 30.44 KG/M2 | WEIGHT: 151 LBS | HEART RATE: 71 BPM

## 2021-12-21 DIAGNOSIS — M25.562 CHRONIC PAIN OF LEFT KNEE: Primary | ICD-10-CM

## 2021-12-21 DIAGNOSIS — M17.12 PRIMARY OSTEOARTHRITIS OF LEFT KNEE: ICD-10-CM

## 2021-12-21 DIAGNOSIS — G89.29 CHRONIC PAIN OF LEFT KNEE: Primary | ICD-10-CM

## 2021-12-21 PROCEDURE — 20610 DRAIN/INJ JOINT/BURSA W/O US: CPT | Performed by: STUDENT IN AN ORGANIZED HEALTH CARE EDUCATION/TRAINING PROGRAM

## 2021-12-22 ENCOUNTER — HOSPITAL ENCOUNTER (OUTPATIENT)
Dept: MRI IMAGING | Facility: HOSPITAL | Age: 56
Discharge: HOME | End: 2021-12-22
Payer: COMMERCIAL

## 2021-12-22 DIAGNOSIS — M47.816 LUMBAR SPONDYLOSIS: ICD-10-CM

## 2021-12-22 PROCEDURE — 72148 MRI LUMBAR SPINE W/O DYE: CPT

## 2022-01-10 ENCOUNTER — HOSPITAL ENCOUNTER (EMERGENCY)
Facility: HOSPITAL | Age: 57
Discharge: HOME | End: 2022-01-10
Attending: EMERGENCY MEDICINE
Payer: COMMERCIAL

## 2022-01-10 VITALS
TEMPERATURE: 98.3 F | DIASTOLIC BLOOD PRESSURE: 83 MMHG | HEART RATE: 96 BPM | SYSTOLIC BLOOD PRESSURE: 128 MMHG | RESPIRATION RATE: 12 BRPM | OXYGEN SATURATION: 100 %

## 2022-01-10 DIAGNOSIS — J22 LOWER RESPIRATORY TRACT INFECTION: Primary | ICD-10-CM

## 2022-01-10 PROCEDURE — 99284 EMERGENCY DEPT VISIT MOD MDM: CPT | Performed by: EMERGENCY MEDICINE

## 2022-01-10 PROCEDURE — 99283 EMERGENCY DEPT VISIT LOW MDM: CPT

## 2022-01-10 RX ORDER — ALBUTEROL SULFATE 2.5 MG/3ML
2.5 SOLUTION RESPIRATORY (INHALATION) EVERY 4 HOURS PRN
Qty: 150 ML | Refills: 0 | Status: SHIPPED | OUTPATIENT
Start: 2022-01-10

## 2022-01-10 NOTE — ED PROVIDER NOTES
History  Chief Complaint   Patient presents with    Cough     exposed to  girlfriend has covid 3 weeks ago  pt started with diarrhea and cough on 12/28  pt is worried "because of my bronchitis"  diarrhea lasted 1 day but congestion continues  want something to break cough up     50 y/o woman presenting to ED with persistent phlegmy/productive cough since last week of December; sx began with rhinorrhea/nasal congestion just before [place]  Progressed to phlegmy cough that has been intermittently p/o phlegm  No hemoptysis  No fever/chills/chest pain/syncope/lightheadedness  Did have diarrhea at onset of sx but this has also resolved leaving only cough at this point  Shortly prior to onset of sx had contact with a friend with whom she went shopping: this friend had positive Covid test shortly thereafter  Hx of asthma for which she has been prescribed a nebulizer previously, but this machine is currently in [place]  Has been treating herself with Mucinex which is not helping her  Patient has never had Covid-19; she is vaccinated x2 and due for booster vaccine on 14 January  Hx persistent asthma on inhaled steroid therapy  Well-appearing nontoxic 74-year-old woman presenting to the emergency department with persistent cough after likely viral lower respiratory tract infection with confirmed COVID exposure  No distress at this point  Given length of time since symptom onset, very low clinical utility to COVID testing and I would not obtain at this point  Given that she has responded well to nebulizer treatment in the past in the setting respiratory tract infection, will prescribe nebulizer and albuterol solution  Encouraged her to maintain her inhaled corticosteroid  Follow-up with primary physician if symptoms persist after about the next 10 days    All questions answered to patient's satisfaction prior to discharge        History provided by:  Patient and medical records  Cough  Associated symptoms: no chest pain, no chills, no fever, no headaches, no shortness of breath and no sore throat        Prior to Admission Medications   Prescriptions Last Dose Informant Patient Reported? Taking? DULoxetine HCl 30 MG CSDR   Yes No   Sig: duloxetine 30 mg capsule,delayed release   Diclofenac Sodium (VOLTAREN) 1 %   No No   Sig: Apply 2 g topically 4 (four) times a day   albuterol (PROVENTIL HFA,VENTOLIN HFA) 90 mcg/act inhaler   Yes No   Sig: albuterol sulfate HFA 90 mcg/actuation aerosol inhaler   celecoxib (CeleBREX) 200 mg capsule   Yes No   Sig: TAKE 1 CAPSULE BY MOUTH TWICE A DAY WITH FOOD FOR 30 DAYS AS NEEDED   clotrimazole 1 % external solution   Yes No   Sig: clotrimazole 1 % topical solution   diclofenac sodium (VOLTAREN) 1 %   No No   Sig: Apply 2 g topically 4 (four) times a day as needed (Pain)   fluticasone (FLOVENT HFA) 110 MCG/ACT inhaler   Yes No   Sig: Flovent  mcg/actuation aerosol inhaler   methocarbamol (ROBAXIN) 500 mg tablet   No No   Sig: Take 1 tablet (500 mg total) by mouth 2 (two) times a day as needed for muscle spasms   naproxen (NAPROSYN) 500 mg tablet   No No   Sig: Take 1 tablet (500 mg total) by mouth 2 (two) times a day with meals   oxyCODONE (ROXICODONE) 5 immediate release tablet   Yes No   Sig: TAKE 2 TABLETS BY MOUTH 3 TIMES A DAY AS NEEDED FOR PAIN   oxyCODONE-acetaminophen (PERCOCET) 5-325 mg per tablet   Yes No   Sig: oxycodone-acetaminophen 5 mg-325 mg tablet   pregabalin (LYRICA) 50 mg capsule   Yes No   Sig: Take 50 mg by mouth daily      Facility-Administered Medications: None       Past Medical History:   Diagnosis Date    Anxiety     Arthritis     hands    Asthma     Degenerative lumbar disc     Depression        Past Surgical History:   Procedure Laterality Date     SECTION         Family History   Family history unknown: Yes     I have reviewed and agree with the history as documented      E-Cigarette/Vaping    E-Cigarette Use Never User E-Cigarette/Vaping Substances    Nicotine No     THC No     CBD No     Flavoring No     Other No     Unknown No      Social History     Tobacco Use    Smoking status: Current Some Day Smoker     Packs/day: 0 50     Years: 30 00     Pack years: 15 00     Types: Cigarettes    Smokeless tobacco: Never Used   Vaping Use    Vaping Use: Never used   Substance Use Topics    Alcohol use: Not Currently    Drug use: Never       Review of Systems   Constitutional: Negative for chills, fatigue and fever  HENT: Negative for congestion and sore throat  Respiratory: Positive for cough and chest tightness  Negative for shortness of breath  Cardiovascular: Negative for chest pain and palpitations  Gastrointestinal: Positive for diarrhea  Negative for nausea and vomiting  Neurological: Negative for light-headedness and headaches  All other systems reviewed and are negative  Physical Exam  Physical Exam  Vitals and nursing note reviewed  Constitutional:       General: She is awake  She is not in acute distress  Appearance: Normal appearance  She is well-developed  HENT:      Head: Normocephalic and atraumatic  Right Ear: Hearing and external ear normal       Left Ear: Hearing and external ear normal    Neck:      Thyroid: No thyroid mass, thyromegaly or thyroid tenderness  Trachea: Trachea and phonation normal    Cardiovascular:      Rate and Rhythm: Normal rate and regular rhythm  Pulses:           Radial pulses are 2+ on the right side and 2+ on the left side  Dorsalis pedis pulses are 2+ on the right side and 2+ on the left side  Posterior tibial pulses are 2+ on the right side and 2+ on the left side  Heart sounds: Normal heart sounds, S1 normal and S2 normal  No murmur heard  No friction rub  No gallop  Pulmonary:      Effort: Pulmonary effort is normal  No respiratory distress  Breath sounds: Normal breath sounds  No stridor   No decreased breath sounds, wheezing, rhonchi or rales  Comments: No respiratory distress  Speaks in full sentences  Occasional phlegmy/nonproductive cough  Skin:     General: Skin is warm and dry  Neurological:      Mental Status: She is alert and oriented to person, place, and time  GCS: GCS eye subscore is 4  GCS verbal subscore is 5  GCS motor subscore is 6  Cranial Nerves: No cranial nerve deficit  Sensory: No sensory deficit  Motor: No abnormal muscle tone  Comments: PERRLA; EOMI  Sensation intact to light touch over face in V1-V3 distribution bilaterally  Facial expressions symmetric  Tongue/uvula midline  Shoulder shrug equal bilaterally  Strength 5/5 in UE/LE bilaterally  Sensation intact to light touch in UE/LE bilaterally  Vital Signs  ED Triage Vitals [01/10/22 1044]   Temperature Pulse Respirations Blood Pressure SpO2   98 3 °F (36 8 °C) 96 12 128/83 100 %      Temp Source Heart Rate Source Patient Position - Orthostatic VS BP Location FiO2 (%)   Oral Monitor -- -- --      Pain Score       No Pain           Vitals:    01/10/22 1044   BP: 128/83   Pulse: 96         Visual Acuity      ED Medications  Medications - No data to display    Diagnostic Studies  Results Reviewed     None                 No orders to display              Procedures  Procedures         ED Course         MDM    Disposition  Final diagnoses:   Lower respiratory tract infection     Time reflects when diagnosis was documented in both MDM as applicable and the Disposition within this note     Time User Action Codes Description Comment    1/10/2022 11:07 AM Kenyatta Lee Add [J22] Lower respiratory tract infection       ED Disposition     ED Disposition Condition Date/Time Comment    Discharge Stable Mon Jose Eduardo 10, 2022 11:07 AM Priyanka Sharp discharge to home/self care              Follow-up Information     Follow up With Specialties Details Why Contact Gisele Wang, [address], Nurse Practitioner Schedule an appointment as soon as possible for a visit in 10 days If symptoms worsen or have not started to improve [facility]  [phone]            Discharge Medication List as of 1/10/2022 11:09 AM      START taking these medications    Details   albuterol (2 5 mg/3 mL) 0 083 % nebulizer solution Take 3 mL (2 5 mg total) by nebulization every 4 (four) hours as needed for wheezing or shortness of breath, Starting Mon 1/10/2022, Normal         CONTINUE these medications which have NOT CHANGED    Details   albuterol (PROVENTIL HFA,VENTOLIN HFA) 90 mcg/act inhaler albuterol sulfate HFA 90 mcg/actuation aerosol inhaler, Historical Med      celecoxib (CeleBREX) 200 mg capsule TAKE 1 CAPSULE BY MOUTH TWICE A DAY WITH FOOD FOR 30 DAYS AS NEEDED, Historical Med      clotrimazole 1 % external solution clotrimazole 1 % topical solution, Historical Med      diclofenac sodium (VOLTAREN) 1 % Apply 2 g topically 4 (four) times a day as needed (Pain), Starting Tue 3/3/2020, Normal      Diclofenac Sodium (VOLTAREN) 1 % Apply 2 g topically 4 (four) times a day, Starting Fri 11/19/2021, Normal      DULoxetine HCl 30 MG CSDR duloxetine 30 mg capsule,delayed release, Historical Med      fluticasone (FLOVENT HFA) 110 MCG/ACT inhaler Flovent  mcg/actuation aerosol inhaler, Historical Med      methocarbamol (ROBAXIN) 500 mg tablet Take 1 tablet (500 mg total) by mouth 2 (two) times a day as needed for muscle spasms, Starting Tue 3/3/2020, Normal      naproxen (NAPROSYN) 500 mg tablet Take 1 tablet (500 mg total) by mouth 2 (two) times a day with meals, Starting Wed 7/14/2021, Normal      oxyCODONE (ROXICODONE) 5 immediate release tablet TAKE 2 TABLETS BY MOUTH 3 TIMES A DAY AS NEEDED FOR PAIN, Historical Med      oxyCODONE-acetaminophen (PERCOCET) 5-325 mg per tablet oxycodone-acetaminophen 5 mg-325 mg tablet, Historical Med      pregabalin (LYRICA) 50 mg capsule Take 50 mg by mouth daily, Starting Mon 8/23/2021, Historical Med             Outpatient Discharge Orders   Nebulizer       PDMP Review     None          ED Provider  Electronically Signed by           Jacqueline Blanc DO  01/10/22 1125

## 2022-02-01 ENCOUNTER — OFFICE VISIT (OUTPATIENT)
Dept: OBGYN CLINIC | Facility: MEDICAL CENTER | Age: 57
End: 2022-02-01
Payer: COMMERCIAL

## 2022-02-01 VITALS
BODY MASS INDEX: 30.32 KG/M2 | HEART RATE: 82 BPM | HEIGHT: 59 IN | TEMPERATURE: 97.7 F | DIASTOLIC BLOOD PRESSURE: 84 MMHG | SYSTOLIC BLOOD PRESSURE: 148 MMHG | WEIGHT: 150.4 LBS

## 2022-02-01 DIAGNOSIS — G89.29 CHRONIC PAIN OF LEFT KNEE: Primary | ICD-10-CM

## 2022-02-01 DIAGNOSIS — M25.562 CHRONIC PAIN OF LEFT KNEE: Primary | ICD-10-CM

## 2022-02-01 DIAGNOSIS — M17.12 PRIMARY OSTEOARTHRITIS OF LEFT KNEE: ICD-10-CM

## 2022-02-01 PROCEDURE — 99212 OFFICE O/P EST SF 10 MIN: CPT | Performed by: STUDENT IN AN ORGANIZED HEALTH CARE EDUCATION/TRAINING PROGRAM

## 2022-02-01 NOTE — PROGRESS NOTES
1  Chronic pain of left knee     2  Primary osteoarthritis of left knee       No orders of the defined types were placed in this encounter  Imaging Studies (I personally reviewed images in PACS and report):    X-ray left knee 02/11/2020:  Mild osteoarthritic changes of the medial joint compartment   Small marginal hypertrophic spurs   Otherwise no acute osseous abnormalities  IMPRESSION:  Chronic left knee pain w/o precipitating injury - improved/alleviated s/p visco injection (12/21/2021)  Primary OA flare (DDx: degenerative meniscal tear/flare)     · Other factors: BMI 30  ·   Previously had a cortisone injection on 06/02/2021 <3 months of relief  PLAN:     Clinical exam and radiographic imaging reviewed with patient today, with impression as per above  I have discussed with the patient the pathophysiology of this diagnosis and reviewed how the examination correlates with this diagnosis   I reassured patient that she is progressively improving since her viscosupplementation injection of her left knee  I counseled that we can repeat this injection every 6 months as needed for knee pain that does not improve with NSAIDs, acetaminophen, ice/heat   I counseled patient that maintaining her range of motion and staying active can help prevent recurrence of this pain in the future  I recommended against any high impact activities to prevent aggravation of arthritic pain  Return in about 5 months (around 7/1/2022)  CHIEF COMPLAINT:  Chief Complaint   Patient presents with    Right Knee - Follow-up    Left Knee - Follow-up         HPI:  Selene Burgess is a 49 y o  female  who presents for       Visit 2/1/2022: Follow-up left knee pain status post viscosupplementation injection on 12/21/2021:  Significant improvement per patient  She states improvements started a few days after her injection and states she has no pain of her left knee at this time    She states other improvements include being more active since last visit and walking further distances without pain of her knee  She denies new injuries of her left knee since last visit  She denies knee swelling, discoloration, giving out  She is satisfied with her progress and does not feel that further intervention is warranted for left knee  Of note, she mentions towards the end of the visit that she had some mild pain over the medial aspect of her right knee but tolerable  She states his pain is improved with NSAIDs sparingly  She does not feel that her right knee warrants an injection at this time but would like to make me aware of it        Medical, Surgical, Family, and Social History    Past Medical History:   Diagnosis Date    Anxiety     Arthritis     hands    Asthma     Degenerative lumbar disc     Depression      Past Surgical History:   Procedure Laterality Date     SECTION       Social History   Social History     Substance and Sexual Activity   Alcohol Use Not Currently     Social History     Substance and Sexual Activity   Drug Use Never     Social History     Tobacco Use   Smoking Status Current Some Day Smoker    Packs/day: 0 50    Years: 30 00    Pack years: 15 00    Types: Cigarettes   Smokeless Tobacco Never Used     Family History   Family history unknown: Yes     No Known Allergies       Physical Exam  /84   Pulse 82   Temp 97 7 °F (36 5 °C)   Ht 4' 11 25" (1 505 m)   Wt 68 2 kg (150 lb 6 4 oz)   BMI 30 12 kg/m²     Constitutional:  see vital signs  Gen: obese, normocephalic/atraumatic, well-groomed  Eyes: No inflammation or discharge of conjunctiva or lids; sclera clear   Pharynx: no inflammation, lesion, or mass of lips  Neck: supple, no masses, non-distended    Ortho Exam  Left Knee Exam:  Erythema: no  Swelling: no  Increased Warmth: no  Tenderness: none  ROM: 0-130  Patellar Apprehension: negative  Patellar Grind: +  Lachman's: negative  Varus laxity: negative  Valgus laxity: negative    Procedures

## 2022-07-07 ENCOUNTER — HOSPITAL ENCOUNTER (OUTPATIENT)
Dept: RADIOLOGY | Facility: HOSPITAL | Age: 57
Discharge: HOME | End: 2022-07-07
Payer: COMMERCIAL

## 2022-07-07 DIAGNOSIS — M25.542 PAIN IN JOINTS OF LEFT HAND: ICD-10-CM

## 2022-07-07 PROCEDURE — 73110 X-RAY EXAM OF WRIST: CPT

## 2022-07-07 PROCEDURE — 73130 X-RAY EXAM OF HAND: CPT

## 2022-09-07 ENCOUNTER — TELEPHONE (OUTPATIENT)
Dept: OBGYN CLINIC | Facility: HOSPITAL | Age: 57
End: 2022-09-07

## 2022-09-07 NOTE — TELEPHONE ENCOUNTER
Patient needs to schedule with hand doc - For Left hand and wrist carpal tunnel - Patient can be reached at [phone]

## 2022-09-15 ENCOUNTER — OFFICE VISIT (OUTPATIENT)
Dept: OBGYN CLINIC | Facility: CLINIC | Age: 57
End: 2022-09-15
Payer: COMMERCIAL

## 2022-09-15 VITALS — BODY MASS INDEX: 30.7 KG/M2 | OXYGEN SATURATION: 99 % | WEIGHT: 156.4 LBS | HEIGHT: 60 IN | HEART RATE: 78 BPM

## 2022-09-15 DIAGNOSIS — M18.12 PRIMARY OSTEOARTHRITIS OF FIRST CARPOMETACARPAL JOINT OF LEFT HAND: Primary | ICD-10-CM

## 2022-09-15 PROCEDURE — 99203 OFFICE O/P NEW LOW 30 MIN: CPT | Performed by: PHYSICIAN ASSISTANT

## 2022-09-15 NOTE — PROGRESS NOTES
ASSESSMENT/PLAN:    Assessment:   Thumb CMC Arthritis  left    Plan:   Thumboprene brace    Follow Up:  Next available with Dr Jose Franklin, Dr Alla Vasquez, or Dr Issac Diaz    To Do Next Visit:       General Discussions:     [facility] Arthritis: The anatomy and physiology of carpometacarpal joint arthritis was discussed with the patient today in the office  Deterioration of the articular cartilage eventually leads to hypermobility at the thumb [facility] joint, resulting in joint subluxation, osteophyte formation, cystic changes within the trapezium and base of the first metacarpal, as well as subchondral sclerosis  Eventually, pain, limited mobility, and compensatory hyperextension at the metacarpophalangeal joint may develop  While normal activity and usage of the thumb joint may provide a painful experience to the patient, this typically does not result in damage to the thumb or hand  Treatment options include resting thumb spica splints to decreased joint edema, pain, and inflammation  Therapy exercises to strengthen the thenar musculature may relieve pain, but do not alter the overall continued development of osteoarthritis  Oral medications, topical medications, corticosteroid injections may decrease pain and increase overall function  Eventually, approximately 5% of patients may require surgical intervention  _____________________________________________________  CHIEF COMPLAINT:  Chief Complaint   Patient presents with    Left Wrist - Carpal Tunnel    Right Wrist - Carpal Tunnel         SUBJECTIVE:  Stan Bal is a 56 y o  female who presents with Pain  Mild  Intermittant  Dull of the left thumb cmc  This started several year(s) ago: Insidious      Radiation: None  Previous Treatments: She had an injection by pain management but states it was not helpful   Associated symptoms: No numbness, tingling  Handedness: right      PAST MEDICAL HISTORY:  Past Medical History:   Diagnosis Date    Anxiety     Arthritis     hands    Asthma     Degenerative lumbar disc     Depression        PAST SURGICAL HISTORY:  Past Surgical History:   Procedure Laterality Date     SECTION         FAMILY HISTORY:  Family History   Family history unknown: Yes       SOCIAL HISTORY:  Social History     Tobacco Use    Smoking status: Current Some Day Smoker     Packs/day: 0 50     Years: 30 00     Pack years: 15 00     Types: Cigarettes    Smokeless tobacco: Never Used   Vaping Use    Vaping Use: Never used   Substance Use Topics    Alcohol use: Not Currently    Drug use: Never       MEDICATIONS:    Current Outpatient Medications:     albuterol (2 5 mg/3 mL) 0 083 % nebulizer solution, Take 3 mL (2 5 mg total) by nebulization every 4 (four) hours as needed for wheezing or shortness of breath, Disp: 150 mL, Rfl: 0    albuterol (PROVENTIL HFA,VENTOLIN HFA) 90 mcg/act inhaler, albuterol sulfate HFA 90 mcg/actuation aerosol inhaler, Disp: , Rfl:     celecoxib (CeleBREX) 200 mg capsule, TAKE 1 CAPSULE BY MOUTH TWICE A DAY WITH FOOD FOR 30 DAYS AS NEEDED, Disp: , Rfl:     clotrimazole 1 % external solution, clotrimazole 1 % topical solution, Disp: , Rfl:     diclofenac sodium (VOLTAREN) 1 %, Apply 2 g topically 4 (four) times a day as needed (Pain), Disp: 100 g, Rfl: 0    Diclofenac Sodium (VOLTAREN) 1 %, Apply 2 g topically 4 (four) times a day, Disp: 100 g, Rfl: 0    DULoxetine HCl 30 MG CSDR, duloxetine 30 mg capsule,delayed release, Disp: , Rfl:     fluticasone (FLOVENT HFA) 110 MCG/ACT inhaler, Flovent  mcg/actuation aerosol inhaler, Disp: , Rfl:     methocarbamol (ROBAXIN) 500 mg tablet, Take 1 tablet (500 mg total) by mouth 2 (two) times a day as needed for muscle spasms, Disp: 20 tablet, Rfl: 0    naproxen (NAPROSYN) 500 mg tablet, Take 1 tablet (500 mg total) by mouth 2 (two) times a day with meals, Disp: 60 tablet, Rfl: 0    oxyCODONE (ROXICODONE) 5 immediate release tablet, TAKE 2 TABLETS BY MOUTH 3 TIMES A DAY AS NEEDED FOR PAIN, Disp: , Rfl:     oxyCODONE-acetaminophen (PERCOCET) 5-325 mg per tablet, oxycodone-acetaminophen 5 mg-325 mg tablet, Disp: , Rfl:     pregabalin (LYRICA) 50 mg capsule, Take 50 mg by mouth daily, Disp: , Rfl:     ALLERGIES:  No Known Allergies    REVIEW OF SYSTEMS:  Pertinent items are noted in HPI  A comprehensive review of systems was negative      LABS:  HgA1c: No results found for: HGBA1C  BMP: No results found for: GLUCOSE, CALCIUM, NA, K, CO2, CL, BUN, CREATININE      _____________________________________________________  PHYSICAL EXAMINATION:  Vital signs: Pulse 78   Ht 4' 11 5" (1 511 m)   Wt 70 9 kg (156 lb 6 4 oz)   SpO2 99%   BMI 31 06 kg/m²   General: well developed and well nourished, alert, oriented times 3 and appears comfortable  Psychiatric: Normal  HEENT: Trachea Midline, No torticollis  Cardiovascular: Regular rate and rhythm  Pulmonary: No audible wheezing   Abdomen: No guarding  Extremities: No lymphedema  Skin: No masses, erythema, lacerations, fluctation, ulcerations  Neurovascular: Sensation Intact to the Median, Ulnar, Radial Nerve, Motor Intact to the Median, Ulnar, Radial Nerve and Pulses Intact    MUSCULOSKELETAL EXAMINATION:  LEFT SIDE:  CMC: Negative Grind, Positive shuck and Positive tendnerness CMC        _____________________________________________________  STUDIES REVIEWED:  Xrays demonstrate CMC osteoarthritis      PROCEDURES PERFORMED:  Procedures  No Procedures performed today

## 2022-10-14 ENCOUNTER — TELEPHONE (OUTPATIENT)
Dept: OBGYN CLINIC | Facility: HOSPITAL | Age: 57
End: 2022-10-14

## 2022-10-14 NOTE — TELEPHONE ENCOUNTER
Caller: Radha    Doctor: Ken Britt    Reason for call: patient calling to cxl and reschedule appt     Call back#: [phone]

## 2022-12-07 ENCOUNTER — HOSPITAL ENCOUNTER (OUTPATIENT)
Dept: MRI IMAGING | Facility: HOSPITAL | Age: 57
Discharge: HOME | End: 2022-12-07

## 2022-12-07 DIAGNOSIS — M19.042 PRIMARY OSTEOARTHRITIS, LEFT HAND: ICD-10-CM

## 2023-02-14 ENCOUNTER — HOSPITAL ENCOUNTER (OUTPATIENT)
Dept: ULTRASOUND IMAGING | Facility: HOSPITAL | Age: 58
Discharge: HOME | End: 2023-02-14

## 2023-02-14 DIAGNOSIS — R74.8 ABNORMAL LEVELS OF OTHER SERUM ENZYMES: ICD-10-CM

## 2023-07-07 ENCOUNTER — HOSPITAL ENCOUNTER (EMERGENCY)
Facility: HOSPITAL | Age: 58
Discharge: HOME | End: 2023-07-07
Attending: EMERGENCY MEDICINE | Admitting: EMERGENCY MEDICINE
Payer: COMMERCIAL

## 2023-07-07 VITALS
HEART RATE: 96 BPM | OXYGEN SATURATION: 100 % | RESPIRATION RATE: 18 BRPM | DIASTOLIC BLOOD PRESSURE: 81 MMHG | BODY MASS INDEX: 31.8 KG/M2 | SYSTOLIC BLOOD PRESSURE: 142 MMHG | WEIGHT: 162 LBS | TEMPERATURE: 98.2 F | HEIGHT: 60 IN

## 2023-07-07 DIAGNOSIS — L73.9 FOLLICULITIS: Primary | ICD-10-CM

## 2023-07-07 PROCEDURE — 99282 EMERGENCY DEPT VISIT SF MDM: CPT

## 2023-07-07 PROCEDURE — 99284 EMERGENCY DEPT VISIT MOD MDM: CPT | Performed by: EMERGENCY MEDICINE

## 2023-07-07 RX ORDER — CEPHALEXIN 500 MG/1
500 CAPSULE ORAL EVERY 12 HOURS SCHEDULED
Qty: 14 CAPSULE | Refills: 0 | Status: SHIPPED | OUTPATIENT
Start: 2023-07-07 | End: 2023-07-14

## 2023-07-07 NOTE — ED PROVIDER NOTES
History  Chief Complaint   Patient presents with   • Hair/Scalp Problem     Pt c/o scalp irritation and itchiness for the past two days. Pt states she tried using oil for some relief but denies relief     Austin Latha is a 66 y.o. female with pmh significant for alopecia who presents with the chief complaint of 2 days of a painful, itchy scalp. She wears a wig daily but denies any recent changes to her wigs, adhesives, etc.  No fevers or chills. She used some hydrocortisone cream last night without improvement. History provided by:  Patient   used: No    Medical Problem  Location:  Scalp  Quality:  Painful, itchy lesions  Severity:  Mild  Onset quality:  Gradual  Duration:  2 days  Timing:  Constant  Progression:  Unchanged  Chronicity:  New  Associated symptoms: rash (scalp)    Associated symptoms: no fever        Prior to Admission Medications   Prescriptions Last Dose Informant Patient Reported? Taking?    DULoxetine HCl 30 MG CSDR   Yes No   Sig: duloxetine 30 mg capsule,delayed release   Diclofenac Sodium (VOLTAREN) 1 %   No No   Sig: Apply 2 g topically 4 (four) times a day   albuterol (2.5 mg/3 mL) 0.083 % nebulizer solution   No No   Sig: Take 3 mL (2.5 mg total) by nebulization every 4 (four) hours as needed for wheezing or shortness of breath   albuterol (PROVENTIL HFA,VENTOLIN HFA) 90 mcg/act inhaler   Yes No   Sig: albuterol sulfate HFA 90 mcg/actuation aerosol inhaler   celecoxib (CeleBREX) 200 mg capsule   Yes No   Sig: TAKE 1 CAPSULE BY MOUTH TWICE A DAY WITH FOOD FOR 30 DAYS AS NEEDED   clotrimazole 1 % external solution   Yes No   Sig: clotrimazole 1 % topical solution   diclofenac sodium (VOLTAREN) 1 %   No No   Sig: Apply 2 g topically 4 (four) times a day as needed (Pain)   fluticasone (FLOVENT HFA) 110 MCG/ACT inhaler   Yes No   Sig: Flovent  mcg/actuation aerosol inhaler   methocarbamol (ROBAXIN) 500 mg tablet   No No   Sig: Take 1 tablet (500 mg total) by mouth 2 (two) times a day as needed for muscle spasms   naproxen (NAPROSYN) 500 mg tablet   No No   Sig: Take 1 tablet (500 mg total) by mouth 2 (two) times a day with meals   oxyCODONE (ROXICODONE) 5 immediate release tablet   Yes No   Sig: TAKE 2 TABLETS BY MOUTH 3 TIMES A DAY AS NEEDED FOR PAIN   oxyCODONE-acetaminophen (PERCOCET) 5-325 mg per tablet   Yes No   Sig: oxycodone-acetaminophen 5 mg-325 mg tablet   pregabalin (LYRICA) 50 mg capsule   Yes No   Sig: Take 50 mg by mouth daily      Facility-Administered Medications: None       Past Medical History:   Diagnosis Date   • Anxiety    • Arthritis     hands   • Asthma    • Degenerative lumbar disc    • Depression        Past Surgical History:   Procedure Laterality Date   •  SECTION         Family History   Family history unknown: Yes     I have reviewed and agree with the history as documented. E-Cigarette/Vaping   • E-Cigarette Use Never User      E-Cigarette/Vaping Substances   • Nicotine No    • THC No    • CBD No    • Flavoring No    • Other No    • Unknown No      Social History     Tobacco Use   • Smoking status: Some Days     Packs/day: 0.50     Years: 30.00     Total pack years: 15.00     Types: Cigarettes   • Smokeless tobacco: Never   Vaping Use   • Vaping Use: Never used   Substance Use Topics   • Alcohol use: Not Currently   • Drug use: Never       Review of Systems   Constitutional: Negative for chills and fever. Skin: Positive for rash (scalp). Negative for color change and wound. Physical Exam  Physical Exam  Vitals and nursing note reviewed. Constitutional:       General: She is in acute distress (mild). Appearance: She is well-developed. HENT:      Head: Normocephalic and atraumatic. Eyes:      Pupils: Pupils are equal, round, and reactive to light. Neck:      Vascular: No JVD. Cardiovascular:      Rate and Rhythm: Normal rate and regular rhythm. Heart sounds: Normal heart sounds. No murmur heard.      No friction rub. No gallop. Pulmonary:      Effort: Pulmonary effort is normal. No respiratory distress. Breath sounds: Normal breath sounds. No wheezing or rales. Chest:      Chest wall: No tenderness. Musculoskeletal:         General: No tenderness. Normal range of motion. Cervical back: Normal range of motion. Skin:     General: Skin is warm and dry. Comments: Several pustules on different follicles consistent with folliculitis. Neurological:      General: No focal deficit present. Mental Status: She is alert and oriented to person, place, and time. Psychiatric:         Behavior: Behavior normal.         Thought Content: Thought content normal.         Judgment: Judgment normal.         Vital Signs  ED Triage Vitals [07/07/23 1346]   Temperature Pulse Respirations Blood Pressure SpO2   98.2 °F (36.8 °C) 96 18 142/81 100 %      Temp Source Heart Rate Source Patient Position - Orthostatic VS BP Location FiO2 (%)   Oral Monitor Sitting Right arm --      Pain Score       --           Vitals:    07/07/23 1346   BP: 142/81   Pulse: 96   Patient Position - Orthostatic VS: Sitting         Visual Acuity      ED Medications  Medications - No data to display    Diagnostic Studies  Results Reviewed     None                 No orders to display              Procedures  Procedures         ED Course                                             Medical Decision Making  Patient presents with s/s consistent with folliculitis. Will give instructions on scalp cleaning. Will treat with oral abx. Other differential considerations initially included ezcema or fungal infection but exam is more consistent with folliculitis. Risk  Prescription drug management. Disposition  Final diagnoses:    Folliculitis - scalp     Time reflects when diagnosis was documented in both MDM as applicable and the Disposition within this note     Time User Action Codes Description Comment    7/7/2023  3:11 PM Jada Goldman Add [C53.7] Folliculitis     4/5/7493  3:11 PM Jason STALEY Modify [Q27.1] Folliculitis scalp      ED Disposition     ED Disposition   Discharge    Condition   Stable    Date/Time   Fri Jul 7, 2023  3:10 PM    Comment   Rossi Herring discharge to home/self care.                Follow-up Information     Follow up With Specialties Details Why Contact Info    Maritza Chun, [address] [facility], Nurse Practitioner Schedule an appointment as soon as possible for a visit in 1 week  [address] [facility]  [phone]            Discharge Medication List as of 7/7/2023  3:12 PM      START taking these medications    Details   cephalexin (KEFLEX) 500 mg capsule Take 1 capsule (500 mg total) by mouth every 12 (twelve) hours for 7 days, Starting Fri 7/7/2023, Until Fri 7/14/2023, Normal         CONTINUE these medications which have NOT CHANGED    Details   albuterol (2.5 mg/3 mL) 0.083 % nebulizer solution Take 3 mL (2.5 mg total) by nebulization every 4 (four) hours as needed for wheezing or shortness of breath, Starting Mon 1/10/2022, Normal      albuterol (PROVENTIL HFA,VENTOLIN HFA) 90 mcg/act inhaler albuterol sulfate HFA 90 mcg/actuation aerosol inhaler, Historical Med      celecoxib (CeleBREX) 200 mg capsule TAKE 1 CAPSULE BY MOUTH TWICE A DAY WITH FOOD FOR 30 DAYS AS NEEDED, Historical Med      clotrimazole 1 % external solution clotrimazole 1 % topical solution, Historical Med      diclofenac sodium (VOLTAREN) 1 % Apply 2 g topically 4 (four) times a day as needed (Pain), Starting Tue 3/3/2020, Normal      Diclofenac Sodium (VOLTAREN) 1 % Apply 2 g topically 4 (four) times a day, Starting Fri 11/19/2021, Normal      DULoxetine HCl 30 MG CSDR duloxetine 30 mg capsule,delayed release, Historical Med      fluticasone (FLOVENT HFA) 110 MCG/ACT inhaler Flovent  mcg/actuation aerosol inhaler, Historical Med      methocarbamol (ROBAXIN) 500 mg tablet Take 1 tablet (500 mg total) by mouth 2 (two) times a day as needed for muscle spasms, Starting Tue 3/3/2020, Normal      naproxen (NAPROSYN) 500 mg tablet Take 1 tablet (500 mg total) by mouth 2 (two) times a day with meals, Starting Wed 7/14/2021, Normal      oxyCODONE (ROXICODONE) 5 immediate release tablet TAKE 2 TABLETS BY MOUTH 3 TIMES A DAY AS NEEDED FOR PAIN, Historical Med      oxyCODONE-acetaminophen (PERCOCET) 5-325 mg per tablet oxycodone-acetaminophen 5 mg-325 mg tablet, Historical Med      pregabalin (LYRICA) 50 mg capsule Take 50 mg by mouth daily, Starting Mon 8/23/2021, Historical Med             No discharge procedures on file.     PDMP Review     None          ED Provider  Electronically Signed by           Dolores Martinez MD  07/07/23 7107
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
## 2023-09-26 ENCOUNTER — OFFICE VISIT (OUTPATIENT)
Dept: OBGYN CLINIC | Facility: CLINIC | Age: 58
End: 2023-09-26
Payer: COMMERCIAL

## 2023-09-26 VITALS — HEIGHT: 60 IN | BODY MASS INDEX: 31.8 KG/M2 | WEIGHT: 162 LBS

## 2023-09-26 DIAGNOSIS — M65.839 INTERSECTION SYNDROME OF WRIST: Primary | ICD-10-CM

## 2023-09-26 PROCEDURE — 99214 OFFICE O/P EST MOD 30 MIN: CPT | Performed by: STUDENT IN AN ORGANIZED HEALTH CARE EDUCATION/TRAINING PROGRAM

## 2023-09-26 PROCEDURE — 20605 DRAIN/INJ JOINT/BURSA W/O US: CPT | Performed by: STUDENT IN AN ORGANIZED HEALTH CARE EDUCATION/TRAINING PROGRAM

## 2023-09-26 RX ADMIN — BUPIVACAINE HYDROCHLORIDE 1 ML: 2.5 INJECTION, SOLUTION EPIDURAL; INFILTRATION; INTRACAUDAL at 15:30

## 2023-09-26 RX ADMIN — BETAMETHASONE SODIUM PHOSPHATE AND BETAMETHASONE ACETATE 6 MG: 3; 3 INJECTION, SUSPENSION INTRA-ARTICULAR; INTRALESIONAL; INTRAMUSCULAR; SOFT TISSUE at 15:30

## 2023-09-26 NOTE — PROGRESS NOTES
ORTHOPAEDIC HAND, WRIST, AND ELBOW OFFICE  VISIT       ASSESSMENT/PLAN:      Diagnoses and all orders for this visit:    Intersection syndrome of wrist  -     Ambulatory Referral to PT/OT Hand Therapy; Future  -     Hand/upper extremity injection: L extensor compartment 2  -     bupivacaine (PF) (MARCAINE) 0.25 % injection 1 mL  -     betamethasone acetate-betamethasone sodium phosphate (CELESTONE) injection 6 mg          66 y.o. female with left wrist intersection syndrome   Treatment options and expected outcomes were discussed. The patient verbalized understanding of exam findings and treatment plan. The patient was given the opportunity to ask questions. Questions were answered to the patient's satisfaction. MRI and x-rays were reviewed in the office today. Treatment options were discussed in the form of bracing, formal therapy and a steroid injection. The patient decided to move forward with bracing, formal therapy and a steroid injection via shared decision making. The patient was instructed to use the cock-up wrist brace at night instead of the thumbobrene brace. The patient consented and underwent a left 2nd extensor injection in the office today without any complications. A referral was also provided to formal therapy. The patient will follow up in 6 weeks for repeat evaluation. Follow Up:  6 weeks       To Do Next Visit:  Re-evaluation of current issue          Yane Delgadillo MD  Attending, Orthopaedic Surgery  Hand, Wrist, and Elbow Surgery  [address] [place] [address]    ____________________________________________________________________________________________________________________________________________      CHIEF COMPLAINT:  Chief Complaint   Patient presents with   • Left Hand - Pain       SUBJECTIVE:  Patient is a 66 y.o. RHD female who presents today for evaluation and treatment of left hand pain. She denies any known injury or trauma.  The patient is referred by Chyrl Kayser, PA. The patient states this has been ongoing for the past year and progressively getting worse. She notes pain to the dorsal aspect of her wrist. She notes increased pain with activity and use. She was evaluated by Chyrl Kayser appx one year ago and was provided with a thumb brace which she states does not provide her with relief. She also swelling to the dorsal aspect of her hand. The patient states she has undergone appx 2-3  injections into her wrist by her pain management physician which she states does provide her with a few days worth of relief. She will take Ibuprofen as needed for pain. She denies prior surgery.       Occupation-  part-time       I have personally reviewed all the relevant PMH, PSH, SH, FH, Medications and allergies      PAST MEDICAL HISTORY:  Past Medical History:   Diagnosis Date   • Anxiety    • Arthritis     hands   • Asthma    • Degenerative lumbar disc    • Depression        PAST SURGICAL HISTORY:  Past Surgical History:   Procedure Laterality Date   •  SECTION         FAMILY HISTORY:  Family History   Family history unknown: Yes       SOCIAL HISTORY:  Social History     Tobacco Use   • Smoking status: Some Days     Packs/day: 0.50     Years: 30.00     Total pack years: 15.00     Types: Cigarettes   • Smokeless tobacco: Never   Vaping Use   • Vaping Use: Never used   Substance Use Topics   • Alcohol use: Not Currently   • Drug use: Never       MEDICATIONS:    Current Outpatient Medications:   •  albuterol (2.5 mg/3 mL) 0.083 % nebulizer solution, Take 3 mL (2.5 mg total) by nebulization every 4 (four) hours as needed for wheezing or shortness of breath, Disp: 150 mL, Rfl: 0  •  albuterol (PROVENTIL HFA,VENTOLIN HFA) 90 mcg/act inhaler, albuterol sulfate HFA 90 mcg/actuation aerosol inhaler, Disp: , Rfl:   •  celecoxib (CeleBREX) 200 mg capsule, TAKE 1 CAPSULE BY MOUTH TWICE A DAY WITH FOOD FOR 30 DAYS AS NEEDED, Disp: , Rfl:   •  clotrimazole 1 % external solution, clotrimazole 1 % topical solution, Disp: , Rfl:   •  diclofenac sodium (VOLTAREN) 1 %, Apply 2 g topically 4 (four) times a day as needed (Pain), Disp: 100 g, Rfl: 0  •  Diclofenac Sodium (VOLTAREN) 1 %, Apply 2 g topically 4 (four) times a day, Disp: 100 g, Rfl: 0  •  DULoxetine HCl 30 MG CSDR, duloxetine 30 mg capsule,delayed release, Disp: , Rfl:   •  fluticasone (FLOVENT HFA) 110 MCG/ACT inhaler, Flovent  mcg/actuation aerosol inhaler, Disp: , Rfl:   •  methocarbamol (ROBAXIN) 500 mg tablet, Take 1 tablet (500 mg total) by mouth 2 (two) times a day as needed for muscle spasms, Disp: 20 tablet, Rfl: 0  •  naproxen (NAPROSYN) 500 mg tablet, Take 1 tablet (500 mg total) by mouth 2 (two) times a day with meals, Disp: 60 tablet, Rfl: 0  •  oxyCODONE (ROXICODONE) 5 immediate release tablet, TAKE 2 TABLETS BY MOUTH 3 TIMES A DAY AS NEEDED FOR PAIN, Disp: , Rfl:   •  oxyCODONE-acetaminophen (PERCOCET) 5-325 mg per tablet, oxycodone-acetaminophen 5 mg-325 mg tablet, Disp: , Rfl:   •  pregabalin (LYRICA) 50 mg capsule, Take 50 mg by mouth daily, Disp: , Rfl:   No current facility-administered medications for this visit. ALLERGIES:  No Known Allergies        REVIEW OF SYSTEMS:  Musculoskeletal:        As noted in HPI. All other systems reviewed and are negative. VITALS:  There were no vitals filed for this visit.     LABS:  HgA1c:   Lab Results   Component Value Date    HGBA1C >15.0 (H) 08/07/2023     BMP: No results found for: "GLUCOSE", "CALCIUM", "NA", "K", "CO2", "CL", "BUN", "CREATININE"    _____________________________________________________  PHYSICAL EXAMINATION:  General: Well developed and well nourished, alert & oriented x 3, appears comfortable  Psychiatric: Normal  HEENT: Normocephalic, Atraumatic Trachea Midline, No torticollis  Pulmonary: No audible wheezing or respiratory distress   Abdomen/GI: Non tender, non distended   Cardiovascular: No pitting edema, 2+ radial pulse Skin: No masses, erythema, lacerations, fluctation, ulcerations  Neurovascular: Sensation Intact to the Median, Ulnar, Radial Nerve, Motor Intact to the Median, Ulnar, Radial Nerve and Pulses Intact  Musculoskeletal: Normal, except as noted in detailed exam and in HPI. MUSCULOSKELETAL EXAMINATION:  Left hand   TTP intersection   Full ROM  Full composite fist  Compartments soft  Brisk capillary refill   ___________________________________________________  STUDIES REVIEWED:  Xrays of the left hand and wrist were reviewed and independently interpreted in PACS by Dr. Lary Hale and demonstrate mild thumb CMC arthritis and MRI of the left hand and wrist was reviewed and independently interpreted in PACS by Dr. Lary Hale and demonstrates mild thumb CMC arthritis and intersection syndrome. PROCEDURES PERFORMED:  Hand/upper extremity injection: L extensor compartment 2  Universal Protocol:  Consent: Verbal consent obtained.   Consent given by: patient  Patient identity confirmed: verbally with patient    Supporting Documentation  Indications: pain   Procedure Details  Condition:intersection syndrome Site: L extensor compartment 2   Preparation: Patient was prepped and draped in the usual sterile fashion  Needle size: 25 G  Ultrasound guidance: no  Medications administered: 1 mL bupivacaine (PF) 0.25 %; 6 mg betamethasone acetate-betamethasone sodium phosphate 6 (3-3) mg/mL    Patient tolerance: patient tolerated the procedure well with no immediate complications  Dressing:  Sterile dressing applied         No Procedures performed today    _____________________________________________________      Scribe Attestation    I,:  Ting Mazariegos MA am acting as a scribe while in the presence of the attending physician.:       I,:  Clary Perdomo MD personally performed the services described in this documentation    as scribed in my presence.:

## 2023-09-27 RX ORDER — BUPIVACAINE HYDROCHLORIDE 2.5 MG/ML
1 INJECTION, SOLUTION EPIDURAL; INFILTRATION; INTRACAUDAL
Status: COMPLETED | OUTPATIENT
Start: 2023-09-26 | End: 2023-09-26

## 2023-09-27 RX ORDER — BETAMETHASONE SODIUM PHOSPHATE AND BETAMETHASONE ACETATE 3; 3 MG/ML; MG/ML
6 INJECTION, SUSPENSION INTRA-ARTICULAR; INTRALESIONAL; INTRAMUSCULAR; SOFT TISSUE
Status: COMPLETED | OUTPATIENT
Start: 2023-09-26 | End: 2023-09-26

## 2023-10-09 ENCOUNTER — EVALUATION (OUTPATIENT)
Dept: OCCUPATIONAL THERAPY | Facility: CLINIC | Age: 58
End: 2023-10-09
Payer: COMMERCIAL

## 2023-10-09 DIAGNOSIS — M65.839 INTERSECTION SYNDROME OF WRIST: Primary | ICD-10-CM

## 2023-10-09 PROCEDURE — 97165 OT EVAL LOW COMPLEX 30 MIN: CPT

## 2023-10-09 NOTE — PROGRESS NOTES
OT Evaluation     Today's date: 10/9/2023  Patient name: Yaw Nieves  : 1965  MRN: [id number]  Referring provider: Italo Hanna MD  Dx:   Encounter Diagnosis     ICD-10-CM    1. Intersection syndrome of wrist  M65.839 Ambulatory Referral to PT/OT Hand Therapy                     Assessment  Assessment details: Patient presents with a diagnosis of left intersection syndrome. Patient symptoms have been ongoing for the past year and they stated that they are progressively getting worse. Patient had their most recent orthopedic appointment on 23 where they were given a CSI injection into the extensor compartment which as provided no relief. Patient states that bracing has not helped relieve symptoms. Patient presents with ROM WNL. Pain noted during thumb and wrist flexion. Patient /pinch strength is decreased in the LUE likely due to decreased use and pain. Positive special tests consistent with diagnosis. Patient was provided a custom HEP and instructed on how to complete it. See below for a more detailed assessment.    Impairments: abnormal coordination, abnormal or restricted ROM, activity intolerance, impaired physical strength and pain with function    Symptom irritability: lowUnderstanding of Dx/Px/POC: good   Prognosis: good    Goals  STGs:    Patient will increase  strength by 5-10 lbs in 4 weeks    Patient will increase pinch strength by 3-5 lbs in 4 weeks     Patient will report decreased pain during functional movement by 1-2 grades in 4 weeks    Patient will demonstrate an understanding of HEP by end of initial session    LTGs:    Patient will increase  strength by 10-20 lbs in 8 weeks or discharge    Patient will improve pinch strength by 5-10 lbs in 8 weeks or discharge    Patient will report resolution of pain symptoms during functional movement in 8 weeks or discharge          Plan  Plan details: Patient presenting to OP OT services due to a diagnosis of intersection syndrome. Patient would benefit from skilled occupational therapy services 2 times per week for 6-8 weeks to return to prior level of function and achieve all of their established goals. Thank you for the referral.   Patient would benefit from: custom splinting, skilled occupational therapy and OT eval  Referral necessary: No  Planned modality interventions: ultrasound, thermotherapy: hydrocollator packs and unattended electrical stimulation  Planned therapy interventions: IADL retraining, patient education, self care, manual therapy, orthotic fitting/training, strengthening, stretching, therapeutic activities, therapeutic exercise, home exercise program, functional ROM exercises and fine motor coordination training  Frequency: 2x week  Duration in visits: 10  Plan of Care beginning date: 10/9/2023  Plan of Care expiration date: 2023  Treatment plan discussed with: patient        Subjective Evaluation    History of Present Illness  Mechanism of injury: Mechanism: Unknown    Subjective:  "Lately it's just a pain that won't go away". "it feels more like a nerve pain that shoots constantly". "I try to do everything but lately it makes me stop what I'm doing to take breaks until the pain decreases". "I type at work and I have to go slower and avoid using the left hand". "I have numbness in my fingertips but not my thumb".            Recurrent probem    Quality of life: good    Patient Goals  Patient goals for therapy: decreased pain, increased strength, return to work and independence with ADLs/IADLs    Pain  Current pain ratin  At best pain ratin  At worst pain rating: 10  Location: 1st dorsal compartment and dorsal forearm  Quality: radiating and sharp  Relieving factors: support and rest  Aggravating factors: lifting and keyboarding (Cleaning, general ROM)  Progression: worsening    Social Support    Employment status: working  Hand dominance: right    Treatments  Previous treatment: injection treatment  Current treatment: injection treatment and occupational therapy        Objective     Tenderness     Left Wrist/Hand   Tenderness in the first dorsal compartment and intersection dorsal forearm area. Neurological Testing     Sensation     Wrist/Hand   Left   Diminished: light touch    Right   Diminished: light touch    Comments   Left light touch: 3.61 all digits   Right light touch: 1. 4.31, 2. 3.61 3-5: 2.83    Active Range of Motion     Left Wrist   Wrist flexion: 65 degrees   Wrist extension: 55 degrees   Radial deviation: 10 degrees   Ulnar deviation: 15 degrees     Right Wrist   Wrist flexion: 70 degrees   Wrist extension: 65 degrees   Radial deviation: 10 degrees   Ulnar deviation: 20 degrees     Left Thumb   Kapandji score: 10 degrees      Right Thumb   Kapandji score: 10 degrees    Additional Active Range of Motion Details  B/L full composite fists    Strength/Myotome Testing     Left Wrist/Hand   Wrist extension: 3+  Wrist flexion: 4+     (2nd hand position)     Trial 1: 39    Thumb Strength  Key/Lateral Pinch     Trial 1: 12    Right Wrist/Hand   Normal wrist strength     (2nd hand position)     Trial 1: 46    Thumb Strength   Key/Lateral Pinch     Trial 1: 15    Tests     Left Elbow   Negative Cozen's. Right Elbow   Negative Cozen's. Left Wrist/Hand   Positive extrinsic extensor tightness, extrinsic flexor tightness, Finkelstein's, resisted middle finger and Tinel's sign (radial tunnel). Negative AIN OK sign and ECU synergy test positive. Right Wrist/Hand   Negative AIN OK sign, ECU synergy test positive, Finkelstein's and resisted middle finger.      Swelling     Left Wrist/Hand   Circumference wrist: 15.5 cm    Right Wrist/Hand   Circumference wrist: 15.5 cm             Precautions: Universal       Manuals 10/9            IASTM             Taping             Cupping                          Neuro Re-Ed             Radial Nerve Glide             Median Nerve glide Ther Ex             HEP Wrist AROM    1st digit AROM              Digit PROM             Wrist PROM             Metal sphere             Wrist Maze             TB on wall                                       Ther Activity             Keypegs             Colored pegs                                                    Modalities             P

## 2023-10-23 ENCOUNTER — OFFICE VISIT (OUTPATIENT)
Dept: OCCUPATIONAL THERAPY | Facility: CLINIC | Age: 58
End: 2023-10-23
Payer: COMMERCIAL

## 2023-10-23 DIAGNOSIS — M65.839 INTERSECTION SYNDROME OF WRIST: Primary | ICD-10-CM

## 2023-10-23 PROCEDURE — 97110 THERAPEUTIC EXERCISES: CPT

## 2023-10-23 PROCEDURE — 97530 THERAPEUTIC ACTIVITIES: CPT

## 2023-10-23 PROCEDURE — 97140 MANUAL THERAPY 1/> REGIONS: CPT

## 2023-10-23 NOTE — PROGRESS NOTES
Daily Note     Today's date: 10/23/2023  Patient name: Koko Fung  : 1965  MRN: [id number]  Referring provider: Eloisa Lennox, MD  Dx:   Encounter Diagnosis     ICD-10-CM    1. Intersection syndrome of wrist  M65.839                      Subjective: "It's feeling a bit better". Objective: See treatment diary below      Assessment: Tolerated treatment well. Patient tolerated exercises and activities well. Will continue to progress as able. Plan: Continue per plan of care. Progress treatment as tolerated.        Precautions: Universal       Manuals 10/9 10/23           IASTM  8'           Taping             Cupping  2'                        Neuro Re-Ed             Radial Nerve Glide             Median Nerve glide                                                                              Ther Ex             HEP Wrist AROM    1st digit AROM              Digit PROM             Wrist PROM  4'           Metal sphere  3'           Wrist Maze  x5           TB on wall  x5           Digiflex  Y iso x10    R comp x20           Hand Power Pro  Y x 20 center           Ext web   Y x 20           Wrist F/E  #1 x 20 F/E           Ther Activity             Keypegs  x1           Colored pegs                                                    Modalities             P  5'

## 2023-11-01 ENCOUNTER — APPOINTMENT (OUTPATIENT)
Dept: OCCUPATIONAL THERAPY | Facility: CLINIC | Age: 58
End: 2023-11-01
Payer: COMMERCIAL

## 2023-11-06 ENCOUNTER — OFFICE VISIT (OUTPATIENT)
Dept: OCCUPATIONAL THERAPY | Facility: CLINIC | Age: 58
End: 2023-11-06
Payer: COMMERCIAL

## 2023-11-06 DIAGNOSIS — M65.839 INTERSECTION SYNDROME OF WRIST: Primary | ICD-10-CM

## 2023-11-06 PROCEDURE — 97110 THERAPEUTIC EXERCISES: CPT

## 2023-11-06 PROCEDURE — 97530 THERAPEUTIC ACTIVITIES: CPT

## 2023-11-06 PROCEDURE — 97140 MANUAL THERAPY 1/> REGIONS: CPT

## 2023-11-06 NOTE — PROGRESS NOTES
Daily Note     Today's date: 2023  Patient name: Montez Mo  : 1965  MRN: [id number]  Referring provider: Marco A Marino MD  Dx:   Encounter Diagnosis     ICD-10-CM    1. Intersection syndrome of wrist  M65.839                        Subjective: "It's feel pretty good today, much less pain". Objective: See treatment diary below      Assessment: Tolerated treatment well. Patient reported significantly less pain on this date. Fatigue noted at end of session but patient was able to tolerate increases in resistance in all exercises. Patient has a follow up with orthopedics on 23 to determine next step in their POC. Plan: Continue per plan of care. Progress treatment as tolerated.        Precautions: Universal       Manuals 10/9 10/23 11/6          IASTM  8' 8          Taping             Cupping  2' 2'                       Neuro Re-Ed             Radial Nerve Glide             Median Nerve glide                                                                              Ther Ex             HEP Wrist AROM    1st digit AROM              Digit PROM             Wrist PROM  4' 4'          Metal sphere  3' 3'          Wrist Maze  x5 x5          TB on wall  x5 x5          Digiflex  Y iso x10    R comp x20 R iso x10    G comp x20          Hand Power Pro  Y x 20 center R x 20 center and rim          Ext web   Y x 20 Y x 20          Wrist F/E  #1 x 20 F/E #2 x 20 F/E          Ther Activity             Keypegs  x1 x1          Colored pegs                                                    Modalities             MHP  5' 5'

## 2023-11-07 ENCOUNTER — OFFICE VISIT (OUTPATIENT)
Dept: OBGYN CLINIC | Facility: CLINIC | Age: 58
End: 2023-11-07
Payer: COMMERCIAL

## 2023-11-07 VITALS — BODY MASS INDEX: 31.8 KG/M2 | WEIGHT: 162 LBS | HEIGHT: 60 IN

## 2023-11-07 DIAGNOSIS — M65.839 INTERSECTION SYNDROME OF WRIST: Primary | ICD-10-CM

## 2023-11-07 PROCEDURE — 99213 OFFICE O/P EST LOW 20 MIN: CPT | Performed by: STUDENT IN AN ORGANIZED HEALTH CARE EDUCATION/TRAINING PROGRAM

## 2023-11-07 NOTE — PROGRESS NOTES
ORTHOPAEDIC HAND, WRIST, AND ELBOW OFFICE  VISIT       ASSESSMENT/PLAN:      Diagnoses and all orders for this visit:    Intersection syndrome of wrist  -     Diclofenac Sodium (VOLTAREN) 1 %; Apply 2 g topically 4 (four) times a day          66 y.o. female with left wrist intersection syndrome  Repeat evaluation performed. Patient reports symptoms have improved  Recommend utilizing cock up wrist brace at night  Continue with HEP   Voltaren gel sent to patients pharmacy       Follow Up:  PRN       To Do Next Visit:  Re-evaluation of current issue      Denise Durand MD  Attending, Orthopaedic Surgery  Hand, Wrist, and Elbow Surgery  [facility]    ____________________________________________________________________________________________________________________________________________      CHIEF COMPLAINT:  Chief Complaint   Patient presents with   • Left Wrist - Follow-up   • Left Hand - Follow-up       SUBJECTIVE:  Patient is a 66 y.o. RHD female who presents today for follow up of left wrist pain secondary to intersection syndrome. Patient has been attending formal hand therapy, using braces, and received a CSI at her last visit 2023. Today patient notes significant improvements. She states she is 85% improved since previous visit. Still notes pain in proximal wrist on the radial aspect. Denies numbness or paresthesias.            I have personally reviewed all the relevant PMH, PSH, SH, FH, Medications and allergies      PAST MEDICAL HISTORY:  Past Medical History:   Diagnosis Date   • Anxiety    • Arthritis     hands   • Asthma    • Degenerative lumbar disc    • Depression        PAST SURGICAL HISTORY:  Past Surgical History:   Procedure Laterality Date   •  SECTION         FAMILY HISTORY:  Family History   Family history unknown: Yes       SOCIAL HISTORY:  Social History     Tobacco Use   • Smoking status: Some Days     Packs/day: 0.50     Years: 30.00     Total pack years: 15.00     Types: Cigarettes   • Smokeless tobacco: Never   Vaping Use   • Vaping Use: Never used   Substance Use Topics   • Alcohol use: Not Currently   • Drug use: Never       MEDICATIONS:    Current Outpatient Medications:   •  albuterol (2.5 mg/3 mL) 0.083 % nebulizer solution, Take 3 mL (2.5 mg total) by nebulization every 4 (four) hours as needed for wheezing or shortness of breath, Disp: 150 mL, Rfl: 0  •  albuterol (PROVENTIL HFA,VENTOLIN HFA) 90 mcg/act inhaler, albuterol sulfate HFA 90 mcg/actuation aerosol inhaler, Disp: , Rfl:   •  celecoxib (CeleBREX) 200 mg capsule, TAKE 1 CAPSULE BY MOUTH TWICE A DAY WITH FOOD FOR 30 DAYS AS NEEDED, Disp: , Rfl:   •  clotrimazole 1 % external solution, clotrimazole 1 % topical solution, Disp: , Rfl:   •  diclofenac sodium (VOLTAREN) 1 %, Apply 2 g topically 4 (four) times a day as needed (Pain), Disp: 100 g, Rfl: 0  •  Diclofenac Sodium (VOLTAREN) 1 %, Apply 2 g topically 4 (four) times a day, Disp: 100 g, Rfl: 1  •  DULoxetine HCl 30 MG CSDR, duloxetine 30 mg capsule,delayed release, Disp: , Rfl:   •  fluticasone (FLOVENT HFA) 110 MCG/ACT inhaler, Flovent  mcg/actuation aerosol inhaler, Disp: , Rfl:   •  methocarbamol (ROBAXIN) 500 mg tablet, Take 1 tablet (500 mg total) by mouth 2 (two) times a day as needed for muscle spasms, Disp: 20 tablet, Rfl: 0  •  naproxen (NAPROSYN) 500 mg tablet, Take 1 tablet (500 mg total) by mouth 2 (two) times a day with meals, Disp: 60 tablet, Rfl: 0  •  oxyCODONE (ROXICODONE) 5 immediate release tablet, TAKE 2 TABLETS BY MOUTH 3 TIMES A DAY AS NEEDED FOR PAIN, Disp: , Rfl:   •  oxyCODONE-acetaminophen (PERCOCET) 5-325 mg per tablet, oxycodone-acetaminophen 5 mg-325 mg tablet, Disp: , Rfl:   •  pregabalin (LYRICA) 50 mg capsule, Take 50 mg by mouth daily, Disp: , Rfl:     ALLERGIES:  No Known Allergies        REVIEW OF SYSTEMS:  Musculoskeletal:        As noted in HPI.    All other systems reviewed and are negative. VITALS:  There were no vitals filed for this visit. LABS:  HgA1c:   Lab Results   Component Value Date    HGBA1C >15.0 (H) 08/07/2023     BMP: No results found for: "GLUCOSE", "CALCIUM", "NA", "K", "CO2", "CL", "BUN", "CREATININE"    _____________________________________________________  PHYSICAL EXAMINATION:  General: Well developed and well nourished, alert & oriented x 3, appears comfortable  Psychiatric: Normal  HEENT: Normocephalic, Atraumatic Trachea Midline, No torticollis  Pulmonary: No audible wheezing or respiratory distress   Abdomen/GI: Non tender, non distended   Cardiovascular: No pitting edema, 2+ radial pulse   Skin: No masses, erythema, lacerations, fluctation, ulcerations  Neurovascular: Sensation Intact to the Median, Ulnar, Radial Nerve, Motor Intact to the Median, Ulnar, Radial Nerve, and Pulses Intact  Musculoskeletal: Normal, except as noted in detailed exam and in HPI.       MUSCULOSKELETAL EXAMINATION:    Left Hand  TTP 2nd and 3rd extensor compartments   Full AROM  Full composite fist  Compartments soft  Brisk capillary refill   ___________________________________________________  STUDIES REVIEWED:  No studies to review         PROCEDURES PERFORMED:  Procedures  No Procedures performed today    _____________________________________________________      Edinson Gene    I,:  Ranjeet Jaimes am acting as a scribe while in the presence of the attending physician.:       I,:  Sonam Wilks MD personally performed the services described in this documentation    as scribed in my presence.:

## 2023-11-18 ENCOUNTER — HOSPITAL ENCOUNTER (OUTPATIENT)
Dept: MRI IMAGING | Facility: HOSPITAL | Age: 58
Discharge: HOME | End: 2023-11-18

## 2023-11-18 DIAGNOSIS — M65.4 RADIAL STYLOID TENOSYNOVITIS (DE QUERVAIN): ICD-10-CM

## 2024-01-04 ENCOUNTER — HOSPITAL ENCOUNTER (EMERGENCY)
Facility: HOSPITAL | Age: 59
Discharge: HOME | End: 2024-01-04
Attending: EMERGENCY MEDICINE
Payer: COMMERCIAL

## 2024-01-04 ENCOUNTER — HOSPITAL ENCOUNTER (OUTPATIENT)
Dept: MRI IMAGING | Facility: HOSPITAL | Age: 59
Discharge: HOME | End: 2024-01-04
Payer: COMMERCIAL

## 2024-01-04 VITALS
SYSTOLIC BLOOD PRESSURE: 128 MMHG | OXYGEN SATURATION: 98 % | RESPIRATION RATE: 20 BRPM | HEART RATE: 86 BPM | DIASTOLIC BLOOD PRESSURE: 73 MMHG | TEMPERATURE: 98.6 F

## 2024-01-04 DIAGNOSIS — M47.816 LUMBAR SPONDYLOSIS: ICD-10-CM

## 2024-01-04 DIAGNOSIS — J45.901 ASTHMA EXACERBATION: ICD-10-CM

## 2024-01-04 DIAGNOSIS — J40 BRONCHITIS: Primary | ICD-10-CM

## 2024-01-04 LAB
FLUAV RNA RESP QL NAA+PROBE: NEGATIVE
FLUBV RNA RESP QL NAA+PROBE: NEGATIVE
RSV RNA RESP QL NAA+PROBE: NEGATIVE
SARS-COV-2 RNA RESP QL NAA+PROBE: NEGATIVE

## 2024-01-04 PROCEDURE — 99284 EMERGENCY DEPT VISIT MOD MDM: CPT | Performed by: EMERGENCY MEDICINE

## 2024-01-04 PROCEDURE — 0241U HB NFCT DS VIR RESP RNA 4 TRGT: CPT | Performed by: EMERGENCY MEDICINE

## 2024-01-04 PROCEDURE — 72148 MRI LUMBAR SPINE W/O DYE: CPT

## 2024-01-04 PROCEDURE — 94640 AIRWAY INHALATION TREATMENT: CPT

## 2024-01-04 PROCEDURE — 99283 EMERGENCY DEPT VISIT LOW MDM: CPT

## 2024-01-04 RX ORDER — PREDNISONE 20 MG/1
60 TABLET ORAL ONCE
Status: COMPLETED | OUTPATIENT
Start: 2024-01-04 | End: 2024-01-04

## 2024-01-04 RX ORDER — ALBUTEROL SULFATE 2.5 MG/3ML
5 SOLUTION RESPIRATORY (INHALATION) ONCE
Status: COMPLETED | OUTPATIENT
Start: 2024-01-04 | End: 2024-01-04

## 2024-01-04 RX ORDER — PREDNISONE 20 MG/1
60 TABLET ORAL DAILY
Qty: 12 TABLET | Refills: 0 | Status: SHIPPED | OUTPATIENT
Start: 2024-01-05 | End: 2024-01-09

## 2024-01-04 RX ORDER — ALBUTEROL SULFATE 90 UG/1
2 AEROSOL, METERED RESPIRATORY (INHALATION) ONCE
Status: COMPLETED | OUTPATIENT
Start: 2024-01-04 | End: 2024-01-04

## 2024-01-04 RX ORDER — GUAIFENESIN 600 MG/1
600 TABLET, EXTENDED RELEASE ORAL ONCE
Status: COMPLETED | OUTPATIENT
Start: 2024-01-04 | End: 2024-01-04

## 2024-01-04 RX ORDER — ALBUTEROL SULFATE 2.5 MG/3ML
2.5 SOLUTION RESPIRATORY (INHALATION) EVERY 6 HOURS PRN
Qty: 75 ML | Refills: 0 | Status: SHIPPED | OUTPATIENT
Start: 2024-01-04

## 2024-01-04 RX ADMIN — PREDNISONE 60 MG: 20 TABLET ORAL at 06:38

## 2024-01-04 RX ADMIN — ALBUTEROL SULFATE 5 MG: 2.5 SOLUTION RESPIRATORY (INHALATION) at 06:39

## 2024-01-04 RX ADMIN — ALBUTEROL SULFATE 2 PUFF: 90 AEROSOL, METERED RESPIRATORY (INHALATION) at 07:26

## 2024-01-04 RX ADMIN — IPRATROPIUM BROMIDE 0.5 MG: 0.5 SOLUTION RESPIRATORY (INHALATION) at 06:39

## 2024-01-04 RX ADMIN — GUAIFENESIN 600 MG: 600 TABLET ORAL at 06:38

## 2024-01-04 NOTE — Clinical Note
Radha Jessica was seen and treated in our emergency department on 1/4/2024.                Diagnosis:     Radha  may return to work on return date.    She may return on this date: 01/05/2024         If you have any questions or concerns, please don't hesitate to call.      Kezia Wright MD    ______________________________           _______________          _______________  Hospital Representative                              Date                                Time

## 2024-01-04 NOTE — ED PROVIDER NOTES
History  Chief Complaint   Patient presents with    Cough     Pt reports cough starting Saturday. Hx of chronic bronchitis. Reports it is difficult to break up the mucus. Abdominal pain when coughing. Denies cp, N/V/D or decreased appetite.      Patient is a 58-year-old female presents to the emergency department explaining that she has bronchitis.  Since Friday (6 days ago) she has experienced cough and difficulty expectorating sputum.  At this point abdominal muscles are sore from working to try and expel sputum.  Cough is occasionally productive.  No hemoptysis.  No chest pain.  No fever, nasal congestion, ear discomfort, change in appetite, nausea, vomiting, bowel or urinary abnormality.  She does have asthma and relates that she did use a saline Nebules with slight relief.  She does have an albuterol inhaler although tries to minimize its use.  Unclear frequency of use. Last PO steroid use was at least several months ago.  She describes a coworker was coughing a lot at the end of last week.        Prior to Admission Medications   Prescriptions Last Dose Informant Patient Reported? Taking?   DULoxetine HCl 30 MG CSDR   Yes No   Sig: duloxetine 30 mg capsule,delayed release   Diclofenac Sodium (VOLTAREN) 1 %   No No   Sig: Apply 2 g topically 4 (four) times a day   albuterol (2.5 mg/3 mL) 0.083 % nebulizer solution   No No   Sig: Take 3 mL (2.5 mg total) by nebulization every 4 (four) hours as needed for wheezing or shortness of breath   albuterol (PROVENTIL HFA,VENTOLIN HFA) 90 mcg/act inhaler   Yes No   Sig: albuterol sulfate HFA 90 mcg/actuation aerosol inhaler   celecoxib (CeleBREX) 200 mg capsule   Yes No   Sig: TAKE 1 CAPSULE BY MOUTH TWICE A DAY WITH FOOD FOR 30 DAYS AS NEEDED   clotrimazole 1 % external solution   Yes No   Sig: clotrimazole 1 % topical solution   diclofenac sodium (VOLTAREN) 1 %   No No   Sig: Apply 2 g topically 4 (four) times a day as needed (Pain)   fluticasone (FLOVENT HFA) 110  MCG/ACT inhaler   Yes No   Sig: Flovent  mcg/actuation aerosol inhaler   methocarbamol (ROBAXIN) 500 mg tablet   No No   Sig: Take 1 tablet (500 mg total) by mouth 2 (two) times a day as needed for muscle spasms   naproxen (NAPROSYN) 500 mg tablet   No No   Sig: Take 1 tablet (500 mg total) by mouth 2 (two) times a day with meals   oxyCODONE (ROXICODONE) 5 immediate release tablet   Yes No   Sig: TAKE 2 TABLETS BY MOUTH 3 TIMES A DAY AS NEEDED FOR PAIN   oxyCODONE-acetaminophen (PERCOCET) 5-325 mg per tablet   Yes No   Sig: oxycodone-acetaminophen 5 mg-325 mg tablet   pregabalin (LYRICA) 50 mg capsule   Yes No   Sig: Take 50 mg by mouth daily      Facility-Administered Medications: None       Past Medical History:   Diagnosis Date    Anxiety     Arthritis     hands    Asthma     Degenerative lumbar disc     Depression        Past Surgical History:   Procedure Laterality Date     SECTION         Family History   Family history unknown: Yes     I have reviewed and agree with the history as documented.    E-Cigarette/Vaping    E-Cigarette Use Never User      E-Cigarette/Vaping Substances    Nicotine No     THC No     CBD No     Flavoring No     Other No     Unknown No      Social History     Tobacco Use    Smoking status: Some Days     Current packs/day: 0.50     Average packs/day: 0.5 packs/day for 30.0 years (15.0 ttl pk-yrs)     Types: Cigarettes    Smokeless tobacco: Never   Vaping Use    Vaping status: Never Used   Substance Use Topics    Alcohol use: Not Currently    Drug use: Never       Review of Systems   All other systems reviewed and are negative.      Physical Exam  Physical Exam  Vitals and nursing note reviewed.   Constitutional:       Appearance: Normal appearance.   HENT:      Head: Normocephalic.      Right Ear: Tympanic membrane and ear canal normal.      Left Ear: Tympanic membrane and ear canal normal.      Mouth/Throat:      Mouth: Mucous membranes are moist.      Pharynx: Posterior  oropharyngeal erythema (mild) present. No oropharyngeal exudate.   Eyes:      Extraocular Movements: Extraocular movements intact.      Conjunctiva/sclera: Conjunctivae normal.   Cardiovascular:      Rate and Rhythm: Normal rate and regular rhythm.      Heart sounds: Normal heart sounds.   Pulmonary:      Effort: Pulmonary effort is normal.      Breath sounds: Wheezing (inspiratory & expiratory) present.   Abdominal:      General: Bowel sounds are normal.      Palpations: Abdomen is soft.   Skin:     General: Skin is warm and dry.   Neurological:      Mental Status: She is alert and oriented to person, place, and time.   Psychiatric:         Mood and Affect: Mood normal.         Behavior: Behavior normal.         Vital Signs  ED Triage Vitals   Temperature Pulse Respirations Blood Pressure SpO2   01/04/24 0705 01/04/24 0625 01/04/24 0625 01/04/24 0625 01/04/24 0625   98.6 °F (37 °C) 92 22 128/73 98 %      Temp src Heart Rate Source Patient Position - Orthostatic VS BP Location FiO2 (%)   -- 01/04/24 0625 -- 01/04/24 0625 --    Monitor  Right arm       Pain Score       --                  Vitals:    01/04/24 0625 01/04/24 0705   BP: 128/73    Pulse: 92 86         Visual Acuity      ED Medications  Medications   predniSONE tablet 60 mg (60 mg Oral Given 1/4/24 0638)   albuterol inhalation solution 5 mg (5 mg Nebulization Given 1/4/24 0639)   ipratropium (ATROVENT) 0.02 % inhalation solution 0.5 mg (0.5 mg Nebulization Given 1/4/24 0639)   guaiFENesin (MUCINEX) 12 hr tablet 600 mg (600 mg Oral Given 1/4/24 0638)   albuterol (PROVENTIL HFA,VENTOLIN HFA) inhaler 2 puff (2 puffs Inhalation Given 1/4/24 0726)       Diagnostic Studies  Results Reviewed       Procedure Component Value Units Date/Time    FLU/RSV/COVID - if FLU/RSV clinically relevant [[id number]]  (Normal) Collected: 01/04/24 0638    Lab Status: Final result Specimen: Nares from Nose Updated: 01/04/24 0723     SARS-CoV-2 Negative     INFLUENZA A PCR Negative      INFLUENZA B PCR Negative     RSV PCR Negative    Narrative:      FOR PEDIATRIC PATIENTS - copy/paste COVID Guidelines URL to browser: [email or web address]    SARS-CoV-2 assay is a Nucleic Acid Amplification assay intended for the  qualitative detection of nucleic acid from SARS-CoV-2 in nasopharyngeal  swabs. Results are for the presumptive identification of SARS-CoV-2 RNA.    Positive results are indicative of infection with SARS-CoV-2, the virus  causing COVID-19, but do not rule out bacterial infection or co-infection  with other viruses. Laboratories within the [place] and its  territories are required to report all positive results to the appropriate  public health authorities. Negative results do not preclude SARS-CoV-2  infection and should not be used as the sole basis for treatment or other  patient management decisions. Negative results must be combined with  clinical observations, patient history, and epidemiological information.  This test has not been FDA cleared or approved.    This test has been authorized by FDA under an Emergency Use Authorization  (EUA). This test is only authorized for the duration of time the  declaration that circumstances exist justifying the authorization of the  emergency use of an in vitro diagnostic tests for detection of SARS-CoV-2  virus and/or diagnosis of COVID-19 infection under section 564(b)(1) of  the Act, 21 U.S.C. 360bbb-3(b)(1), unless the authorization is terminated  or revoked sooner. The test has been validated but independent review by FDA  and CLIA is pending.    Test performed using [employer] GeneXpert: This RT-PCR assay targets N2,  a region unique to SARS-CoV-2. A conserved region in the E-gene was chosen  for pan-Sarbecovirus detection which includes SARS-CoV-2.    According to CMS-2020-01-R, this platform meets the definition of high-throughput technology.                   No orders to  display              Procedures  Procedures         ED Course  ED Course as of 01/04/24 0942   Thu Jan 04, 2024   0653 Appreciating improvement in breathing with nebulizer treatment and progress   0714 Feeling improved.  Wheezing resolved.  Reviewed ongoing supportive care at home.  Patient will be contacted if she tests positive for one of the viral illnesses checked.                               SBIRT 20yo+      Flowsheet Row Most Recent Value   Initial Alcohol Screen: US AUDIT-C     1. How often do you have a drink containing alcohol? 0 Filed at: 01/04/2024 0706   2. How many drinks containing alcohol do you have on a typical day you are drinking?  0 Filed at: 01/04/2024 0706   3b. FEMALE Any Age, or MALE 65+: How often do you have 4 or more drinks on one occassion? 0 Filed at: 01/04/2024 0706   Audit-C Score 0 Filed at: 01/04/2024 0706   JAZMYNE: How many times in the past year have you...    Used an illegal drug or used a prescription medication for non-medical reasons? Never Filed at: 01/04/2024 0706                      Medical Decision Making  Differential diagnosis included but was not limited to URI, bronchitis, asthma exacerbation, pneumonia, much less likely ACS, pulmonary edema or allergic reaction.    Problems Addressed:  Asthma exacerbation: acute illness or injury  Bronchitis: acute illness or injury    Risk  OTC drugs.  Prescription drug management.             Disposition  Final diagnoses:   Bronchitis   Asthma exacerbation     Time reflects when diagnosis was documented in both MDM as applicable and the Disposition within this note       Time User Action Codes Description Comment    1/4/2024  6:39 AM Kezia Wright Add [J40] Bronchitis     1/4/2024  6:39 AM Kezia Wright Add [J45.901] Asthma exacerbation           ED Disposition       ED Disposition   Discharge    Condition   Stable    Date/Time   Thu Jan 4, 2024 0715    Comment   Radha Jessica discharge to home/self  care.                   Follow-up Information       Follow up With Specialties Details Why Contact Info Additional Information    [facility], Nurse Practitioner Schedule an appointment as soon as possible for a visit   [address] [facility] [address]  [phone]       [facility] Emergency Department Emergency Medicine Go to  As needed, If symptoms worsen [address] [facility] [address]  [phone] [facility] Emergency Department, [address], [place], [address]            Discharge Medication List as of 1/4/2024  7:15 AM        START taking these medications    Details   !! albuterol (2.5 mg/3 mL) 0.083 % nebulizer solution Take 3 mL (2.5 mg total) by nebulization every 6 (six) hours as needed for wheezing or shortness of breath, Starting Thu 1/4/2024, Normal      predniSONE 20 mg tablet Take 3 tablets (60 mg total) by mouth daily for 4 days Do not start before January 5, 2024., Starting Fri 1/5/2024, Until Tue 1/9/2024, Normal       !! - Potential duplicate medications found. Please discuss with provider.        CONTINUE these medications which have NOT CHANGED    Details   !! albuterol (2.5 mg/3 mL) 0.083 % nebulizer solution Take 3 mL (2.5 mg total) by nebulization every 4 (four) hours as needed for wheezing or shortness of breath, Starting Mon 1/10/2022, Normal      albuterol (PROVENTIL HFA,VENTOLIN HFA) 90 mcg/act inhaler albuterol sulfate HFA 90 mcg/actuation aerosol inhaler, Historical Med      celecoxib (CeleBREX) 200 mg capsule TAKE 1 CAPSULE BY MOUTH TWICE A DAY WITH FOOD FOR 30 DAYS AS NEEDED, Historical Med      clotrimazole 1 % external solution clotrimazole 1 % topical solution, Historical Med      diclofenac sodium (VOLTAREN) 1 % Apply 2 g topically 4 (four) times a day as needed (Pain), Starting Tue 3/3/2020, Normal      Diclofenac Sodium (VOLTAREN) 1 % Apply 2 g topically 4 (four) times a  day, Starting Tue 11/7/2023, Normal      DULoxetine HCl 30 MG CSDR duloxetine 30 mg capsule,delayed release, Historical Med      fluticasone (FLOVENT HFA) 110 MCG/ACT inhaler Flovent  mcg/actuation aerosol inhaler, Historical Med      methocarbamol (ROBAXIN) 500 mg tablet Take 1 tablet (500 mg total) by mouth 2 (two) times a day as needed for muscle spasms, Starting Tue 3/3/2020, Normal      naproxen (NAPROSYN) 500 mg tablet Take 1 tablet (500 mg total) by mouth 2 (two) times a day with meals, Starting Wed 7/14/2021, Normal      oxyCODONE (ROXICODONE) 5 immediate release tablet TAKE 2 TABLETS BY MOUTH 3 TIMES A DAY AS NEEDED FOR PAIN, Historical Med      oxyCODONE-acetaminophen (PERCOCET) 5-325 mg per tablet oxycodone-acetaminophen 5 mg-325 mg tablet, Historical Med      pregabalin (LYRICA) 50 mg capsule Take 50 mg by mouth daily, Starting Mon 8/23/2021, Historical Med       !! - Potential duplicate medications found. Please discuss with provider.          No discharge procedures on file.    PDMP Review       None            ED Provider  Electronically Signed by             Kezia Wright MD  01/04/24 8731

## 2024-01-04 NOTE — ED NOTES
Patient ambulated to bathroom with steady gait and no acute distress     Cecile Andre RN  01/04/24 0714

## 2024-02-12 ENCOUNTER — TELEPHONE (OUTPATIENT)
Dept: OBGYN CLINIC | Facility: CLINIC | Age: 59
End: 2024-02-12

## 2024-02-12 NOTE — TELEPHONE ENCOUNTER
Called patient to let her know we are closing due to weather tomorrow, patient is now scheduled for next week.

## 2024-02-20 ENCOUNTER — OFFICE VISIT (OUTPATIENT)
Dept: OBGYN CLINIC | Facility: CLINIC | Age: 59
End: 2024-02-20
Payer: COMMERCIAL

## 2024-02-20 VITALS — WEIGHT: 151 LBS | HEIGHT: 60 IN | BODY MASS INDEX: 29.64 KG/M2

## 2024-02-20 DIAGNOSIS — M65.839 INTERSECTION SYNDROME OF WRIST: Primary | ICD-10-CM

## 2024-02-20 PROCEDURE — 20605 DRAIN/INJ JOINT/BURSA W/O US: CPT | Performed by: STUDENT IN AN ORGANIZED HEALTH CARE EDUCATION/TRAINING PROGRAM

## 2024-02-20 PROCEDURE — 99214 OFFICE O/P EST MOD 30 MIN: CPT | Performed by: STUDENT IN AN ORGANIZED HEALTH CARE EDUCATION/TRAINING PROGRAM

## 2024-02-20 RX ADMIN — BETAMETHASONE SODIUM PHOSPHATE AND BETAMETHASONE ACETATE 6 MG: 3; 3 INJECTION, SUSPENSION INTRA-ARTICULAR; INTRALESIONAL; INTRAMUSCULAR; SOFT TISSUE at 15:30

## 2024-02-20 RX ADMIN — BUPIVACAINE HYDROCHLORIDE 1 ML: 2.5 INJECTION, SOLUTION INFILTRATION; PERINEURAL at 15:30

## 2024-02-20 NOTE — PROGRESS NOTES
ORTHOPAEDIC HAND, WRIST, AND ELBOW OFFICE  VISIT      ASSESSMENT/PLAN:      Diagnoses and all orders for this visit:    Intersection syndrome of wrist  -     Hand/upper extremity injection: L extensor compartment 2  -     bupivacaine (MARCAINE) 0.25 % injection 1 mL  -     betamethasone acetate-betamethasone sodium phosphate (CELESTONE) injection 6 mg          58 y.o. female with left wrist intersection syndrome.  The patient did see good relief from previous steroid injection.  We discussed continued non operative versus surgical intervention.  The patient elected to proceed with non operative treatment options.  She consented and underwent a left 2nd dorsal compartment CSI in the office today.  The patient is a diabetic and her last A1 C was 9 and she is aware it will need to be 8 or below to undergo with surgical intervention.  She will continue with the brace.  She will follow up in 6 weeks for repeat evaluation. If she is doing well, she may cancel.      Follow Up:  6 weeks       To Do Next Visit:  Re-evaluation of current issue        Mohit Thibodeaux MD  Attending, Orthopaedic Surgery  Hand, Wrist, and Elbow Surgery  [facility]    ______________________________________________________________________________________________    CHIEF COMPLAINT:  Chief Complaint   Patient presents with   • Left Hand - Follow-up       SUBJECTIVE:  Patient is a 58 y.o. RHD female who presents today for follow up of left wrist pain. The patient was treating previously for intersection syndrome. She underwent a steroid injection on 9/26/23 which she states provided her with relief. The patient was doing well until yesterday when her pain returned. She notes constant dull pain to the radial aspect of her wrist. She states the pain can radiate up the arm. She has been taking OTC medications for pain. She has been using a thumb-0-prene brace. She also has Oxycodone for back pain. The patient is a diabetic and  states her last A1 C was 9.      I have personally reviewed all the relevant PMH, PSH, SH, FH, Medications and allergies      PAST MEDICAL HISTORY:  Past Medical History:   Diagnosis Date   • Anxiety    • Arthritis     hands   • Asthma    • Degenerative lumbar disc    • Depression        PAST SURGICAL HISTORY:  Past Surgical History:   Procedure Laterality Date   •  SECTION         FAMILY HISTORY:  Family History   Family history unknown: Yes       SOCIAL HISTORY:  Social History     Tobacco Use   • Smoking status: Some Days     Current packs/day: 0.50     Average packs/day: 0.5 packs/day for 30.0 years (15.0 ttl pk-yrs)     Types: Cigarettes   • Smokeless tobacco: Never   Vaping Use   • Vaping status: Never Used   Substance Use Topics   • Alcohol use: Not Currently   • Drug use: Never       MEDICATIONS:    Current Outpatient Medications:   •  albuterol (2.5 mg/3 mL) 0.083 % nebulizer solution, Take 3 mL (2.5 mg total) by nebulization every 4 (four) hours as needed for wheezing or shortness of breath, Disp: 150 mL, Rfl: 0  •  albuterol (2.5 mg/3 mL) 0.083 % nebulizer solution, Take 3 mL (2.5 mg total) by nebulization every 6 (six) hours as needed for wheezing or shortness of breath, Disp: 75 mL, Rfl: 0  •  albuterol (PROVENTIL HFA,VENTOLIN HFA) 90 mcg/act inhaler, albuterol sulfate HFA 90 mcg/actuation aerosol inhaler, Disp: , Rfl:   •  celecoxib (CeleBREX) 200 mg capsule, TAKE 1 CAPSULE BY MOUTH TWICE A DAY WITH FOOD FOR 30 DAYS AS NEEDED, Disp: , Rfl:   •  clotrimazole 1 % external solution, clotrimazole 1 % topical solution, Disp: , Rfl:   •  diclofenac sodium (VOLTAREN) 1 %, Apply 2 g topically 4 (four) times a day as needed (Pain), Disp: 100 g, Rfl: 0  •  Diclofenac Sodium (VOLTAREN) 1 %, Apply 2 g topically 4 (four) times a day, Disp: 100 g, Rfl: 1  •  DULoxetine HCl 30 MG CSDR, duloxetine 30 mg capsule,delayed release, Disp: , Rfl:   •  fluticasone (FLOVENT HFA) 110 MCG/ACT inhaler, Flovent   mcg/actuation aerosol inhaler, Disp: , Rfl:   •  methocarbamol (ROBAXIN) 500 mg tablet, Take 1 tablet (500 mg total) by mouth 2 (two) times a day as needed for muscle spasms, Disp: 20 tablet, Rfl: 0  •  naproxen (NAPROSYN) 500 mg tablet, Take 1 tablet (500 mg total) by mouth 2 (two) times a day with meals, Disp: 60 tablet, Rfl: 0  •  oxyCODONE (ROXICODONE) 5 immediate release tablet, TAKE 2 TABLETS BY MOUTH 3 TIMES A DAY AS NEEDED FOR PAIN, Disp: , Rfl:   •  oxyCODONE-acetaminophen (PERCOCET) 5-325 mg per tablet, oxycodone-acetaminophen 5 mg-325 mg tablet, Disp: , Rfl:   •  pregabalin (LYRICA) 50 mg capsule, Take 50 mg by mouth daily, Disp: , Rfl:   No current facility-administered medications for this visit.    ALLERGIES:  No Known Allergies        REVIEW OF SYSTEMS:  Musculoskeletal:        As noted in HPI.   All other systems reviewed and are negative.    VITALS:  There were no vitals filed for this visit.    LABS:  HgA1c:   Lab Results   Component Value Date    HGBA1C >15.0 (H) 08/07/2023     BMP:   Lab Results   Component Value Date    CALCIUM 9.9 08/07/2023    K 4.3 08/07/2023    CO2 24 08/07/2023     08/07/2023    BUN 13 08/07/2023    CREATININE 0.76 08/07/2023       _____________________________________________________  PHYSICAL EXAMINATION:  General: Well developed and well nourished, alert & oriented x 3, appears comfortable  Psychiatric: Normal  HEENT: Normocephalic, Atraumatic Trachea Midline, No torticollis  Pulmonary: No audible wheezing or respiratory distress   Abdomen/GI: Non tender, non distended   Cardiovascular: No pitting edema, 2+ radial pulse   Skin: No masses, erythema, lacerations, fluctation, ulcerations  Neurovascular: Sensation Intact to the Median, Ulnar, Radial Nerve, Motor Intact to the Median, Ulnar, Radial Nerve, and Pulses Intact  Musculoskeletal: Normal, except as noted in detailed exam and in HPI.      MUSCULOSKELETAL EXAMINATION:  Left wrist  TTP 1st dorsal compartment    Less TTP intersection syndrome  Compartments soft  Brisk capillary refill     ___________________________________________________  STUDIES REVIEWED:  No new studies to review         PROCEDURES PERFORMED:  Hand/upper extremity injection: L extensor compartment 2  Universal Protocol:  Consent: Verbal consent obtained.  Consent given by: patient  Patient identity confirmed: verbally with patient  Supporting Documentation  Indications: pain   Procedure Details  Condition:intersection syndrome Site: L extensor compartment 2   Preparation: Patient was prepped and draped in the usual sterile fashion  Needle size: 25 G  Ultrasound guidance: no  Medications administered: 1 mL bupivacaine 0.25 %; 6 mg betamethasone acetate-betamethasone sodium phosphate 6 (3-3) mg/mL  Patient tolerance: patient tolerated the procedure well with no immediate complications  Dressing:  Sterile dressing applied              _____________________________________________________      Scribe Attestation    I,:  Ting Mazariegos MA am acting as a scribe while in the presence of the attending physician.:       I,:  Mohit Thibodeaux MD personally performed the services described in this documentation    as scribed in my presence.:

## 2024-02-21 RX ORDER — BUPIVACAINE HYDROCHLORIDE 2.5 MG/ML
1 INJECTION, SOLUTION INFILTRATION; PERINEURAL
Status: COMPLETED | OUTPATIENT
Start: 2024-02-20 | End: 2024-02-20

## 2024-02-21 RX ORDER — BETAMETHASONE SODIUM PHOSPHATE AND BETAMETHASONE ACETATE 3; 3 MG/ML; MG/ML
6 INJECTION, SUSPENSION INTRA-ARTICULAR; INTRALESIONAL; INTRAMUSCULAR; SOFT TISSUE
Status: COMPLETED | OUTPATIENT
Start: 2024-02-20 | End: 2024-02-20

## 2024-06-13 ENCOUNTER — APPOINTMENT (EMERGENCY)
Dept: CT IMAGING | Facility: HOSPITAL | Age: 59
End: 2024-06-13
Payer: COMMERCIAL

## 2024-06-13 ENCOUNTER — HOSPITAL ENCOUNTER (EMERGENCY)
Facility: HOSPITAL | Age: 59
Discharge: HOME | End: 2024-06-13
Attending: EMERGENCY MEDICINE
Payer: COMMERCIAL

## 2024-06-13 VITALS
TEMPERATURE: 98.6 F | DIASTOLIC BLOOD PRESSURE: 95 MMHG | HEART RATE: 92 BPM | SYSTOLIC BLOOD PRESSURE: 170 MMHG | OXYGEN SATURATION: 99 % | RESPIRATION RATE: 16 BRPM

## 2024-06-13 DIAGNOSIS — M54.42 ACUTE BILATERAL LOW BACK PAIN WITH BILATERAL SCIATICA: Primary | ICD-10-CM
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
## 2024-06-13 DIAGNOSIS — M54.41 ACUTE BILATERAL LOW BACK PAIN WITH BILATERAL SCIATICA: Primary | ICD-10-CM

## 2024-06-13 PROCEDURE — 96372 THER/PROPH/DIAG INJ SC/IM: CPT

## 2024-06-13 PROCEDURE — 99284 EMERGENCY DEPT VISIT MOD MDM: CPT | Performed by: EMERGENCY MEDICINE

## 2024-06-13 PROCEDURE — 99284 EMERGENCY DEPT VISIT MOD MDM: CPT

## 2024-06-13 PROCEDURE — 72131 CT LUMBAR SPINE W/O DYE: CPT

## 2024-06-13 RX ORDER — KETOROLAC TROMETHAMINE 30 MG/ML
30 INJECTION, SOLUTION INTRAMUSCULAR; INTRAVENOUS ONCE
Status: COMPLETED | OUTPATIENT
Start: 2024-06-13 | End: 2024-06-13

## 2024-06-13 RX ORDER — PREDNISONE 10 MG/1
TABLET ORAL
Qty: 14 TABLET | Refills: 0 | Status: SHIPPED | OUTPATIENT
Start: 2024-06-14 | End: 2024-06-20

## 2024-06-13 RX ORDER — LIDOCAINE 50 MG/G
1 PATCH TOPICAL ONCE
Status: DISCONTINUED | OUTPATIENT
Start: 2024-06-13 | End: 2024-06-13 | Stop reason: HOSPADM

## 2024-06-13 RX ORDER — ACETAMINOPHEN 325 MG/1
650 TABLET ORAL ONCE
Status: COMPLETED | OUTPATIENT
Start: 2024-06-13 | End: 2024-06-13

## 2024-06-13 RX ORDER — PREDNISONE 20 MG/1
60 TABLET ORAL ONCE
Status: DISCONTINUED | OUTPATIENT
Start: 2024-06-13 | End: 2024-06-13 | Stop reason: HOSPADM

## 2024-06-13 RX ADMIN — LIDOCAINE 5% 1 PATCH: 700 PATCH TOPICAL at 17:30

## 2024-06-13 RX ADMIN — KETOROLAC TROMETHAMINE 30 MG: 30 INJECTION, SOLUTION INTRAMUSCULAR; INTRAVENOUS at 17:29

## 2024-06-13 RX ADMIN — ACETAMINOPHEN 650 MG: 325 TABLET, FILM COATED ORAL at 17:29

## 2024-06-13 NOTE — ED PROVIDER NOTES
"History  Chief Complaint   Patient presents with    Fall     Patient states she had a fall yesterday leaving work, crossing the street to get to bus, put foot up on sidewalk and then \"next thing you know I was on the ground\". Patient tried to brace her fall, but slid onto side. Patient states it feels like she has a lump in her back in the lumbar area, pain going down legs and making knees feel weak, mainly on L side. Denies LOC/HS/thinners.      Radha is a 59 year old female with a history of degenerative lumbar disc disease, arthritis, anxiety, presenting for evaluation of low back pain radiating into her bilateral legs after a mechanical fall yesterday.  Patient described that she was walking across a crosswalk, she was going to step over a curb and tripped on the curb and fell to the ground.  Patient described trying to brace her fall forward but fell onto her side.  She had immediate pain in her back, describes it is in the lumbar region and radiates into the both legs.  She has a history of chronic back pain for which she follows with pain specialist and receives epidural injections, last injection was 2 months ago.  Patient feels as though the region is more swollen than normal, she was concerned she may have fractured something.  Denies any other injuries, no head strike, no loss consciousness.  She denies any numbness or weakness of the lower extremities, no saddle anesthesia, no incontinence of bowel or bladder.      History provided by:  Patient and medical records   used: No        Prior to Admission Medications   Prescriptions Last Dose Informant Patient Reported? Taking?   DULoxetine HCl 30 MG CSDR   Yes No   Sig: duloxetine 30 mg capsule,delayed release   Diclofenac Sodium (VOLTAREN) 1 %   No No   Sig: Apply 2 g topically 4 (four) times a day   albuterol (2.5 mg/3 mL) 0.083 % nebulizer solution   No No   Sig: Take 3 mL (2.5 mg total) by nebulization every 4 (four) hours as needed " for wheezing or shortness of breath   albuterol (2.5 mg/3 mL) 0.083 % nebulizer solution   No No   Sig: Take 3 mL (2.5 mg total) by nebulization every 6 (six) hours as needed for wheezing or shortness of breath   albuterol (PROVENTIL HFA,VENTOLIN HFA) 90 mcg/act inhaler   Yes No   Sig: albuterol sulfate HFA 90 mcg/actuation aerosol inhaler   celecoxib (CeleBREX) 200 mg capsule   Yes No   Sig: TAKE 1 CAPSULE BY MOUTH TWICE A DAY WITH FOOD FOR 30 DAYS AS NEEDED   clotrimazole 1 % external solution   Yes No   Sig: clotrimazole 1 % topical solution   diclofenac sodium (VOLTAREN) 1 %   No No   Sig: Apply 2 g topically 4 (four) times a day as needed (Pain)   fluticasone (FLOVENT HFA) 110 MCG/ACT inhaler   Yes No   Sig: Flovent  mcg/actuation aerosol inhaler   methocarbamol (ROBAXIN) 500 mg tablet   No No   Sig: Take 1 tablet (500 mg total) by mouth 2 (two) times a day as needed for muscle spasms   naproxen (NAPROSYN) 500 mg tablet   No No   Sig: Take 1 tablet (500 mg total) by mouth 2 (two) times a day with meals   oxyCODONE (ROXICODONE) 5 immediate release tablet   Yes No   Sig: TAKE 2 TABLETS BY MOUTH 3 TIMES A DAY AS NEEDED FOR PAIN   oxyCODONE-acetaminophen (PERCOCET) 5-325 mg per tablet   Yes No   Sig: oxycodone-acetaminophen 5 mg-325 mg tablet   pregabalin (LYRICA) 50 mg capsule   Yes No   Sig: Take 50 mg by mouth daily      Facility-Administered Medications: None       Past Medical History:   Diagnosis Date    Anxiety     Arthritis     hands    Asthma     Degenerative lumbar disc     Depression        Past Surgical History:   Procedure Laterality Date     SECTION         Family History   Family history unknown: Yes     I have reviewed and agree with the history as documented.    E-Cigarette/Vaping    E-Cigarette Use Never User      E-Cigarette/Vaping Substances    Nicotine No     THC No     CBD No     Flavoring No     Other No     Unknown No      Social History     Tobacco Use    Smoking status: Some  Days     Current packs/day: 0.50     Average packs/day: 0.5 packs/day for 30.0 years (15.0 ttl pk-yrs)     Types: Cigarettes    Smokeless tobacco: Never   Vaping Use    Vaping status: Never Used   Substance Use Topics    Alcohol use: Not Currently    Drug use: Never        Review of Systems    Physical Exam  ED Triage Vitals [06/13/24 1536]   Temperature Pulse Respirations Blood Pressure SpO2   98.6 °F (37 °C) 92 16 170/95 99 %      Temp Source Heart Rate Source Patient Position - Orthostatic VS BP Location FiO2 (%)   Oral Monitor;Right Sitting Right arm --      Pain Score       9             Orthostatic Vital Signs  Vitals:    06/13/24 1536   BP: 170/95   Pulse: 92   Patient Position - Orthostatic VS: Sitting       Physical Exam  Vitals and nursing note reviewed.   Constitutional:       General: She is not in acute distress.     Appearance: Normal appearance.   HENT:      Head: Normocephalic and atraumatic.      Mouth/Throat:      Mouth: Mucous membranes are moist.      Pharynx: Oropharynx is clear.   Eyes:      General: No scleral icterus.     Conjunctiva/sclera: Conjunctivae normal.   Cardiovascular:      Rate and Rhythm: Normal rate and regular rhythm.      Heart sounds: Normal heart sounds.   Pulmonary:      Effort: Pulmonary effort is normal. No respiratory distress.      Breath sounds: Normal breath sounds.   Abdominal:      General: Abdomen is flat. There is no distension.      Tenderness: There is no abdominal tenderness.   Musculoskeletal:         General: No signs of injury.      Cervical back: Normal and neck supple. No rigidity.      Thoracic back: Normal.      Lumbar back: Tenderness and bony tenderness present. Decreased range of motion.      Comments: Patient with tenderness to palpation of the midline lumbar spine, both right and left paraspinal muscles, I do not appreciate any step-offs or deformities, she was able to ambulate without any difficulty, no numbness or weakness of the lower  extremities.   Skin:     General: Skin is warm.      Coloration: Skin is not jaundiced.      Findings: No erythema or rash.   Neurological:      General: No focal deficit present.      Mental Status: She is alert and oriented to person, place, and time. Mental status is at baseline.      Sensory: No sensory deficit.      Motor: No weakness.      Gait: Gait normal.      Comments: No numbness or weakness of the lower extremities, she was able to stand and ambulate with no difficulty   Psychiatric:         Mood and Affect: Mood normal.         Behavior: Behavior normal.         ED Medications  Medications   lidocaine (LIDODERM) 5 % patch 1 patch (1 patch Topical Medication Applied 6/13/24 1730)   predniSONE tablet 60 mg (60 mg Oral Not Given 6/13/24 1914)   acetaminophen (TYLENOL) tablet 650 mg (650 mg Oral Given 6/13/24 1729)   ketorolac (TORADOL) injection 30 mg (30 mg Intramuscular Given 6/13/24 1729)       Diagnostic Studies  Results Reviewed       None                   CT lumbar spine without contrast   Final Result by Erika Ramon MD (06/13 1827)      No acute bone pathology.      Chronic, mild degenerative disc disease and lower lumbar facet osteoarthritis with degenerative spondylolisthesis.            Workstation performed: [id number]               Procedures  Procedures      ED Course                                       Medical Decision Making  Radha is a 59 year old female with a history of degenerative lumbar disc disease, arthritis, anxiety, presenting for evaluation of low back pain radiating into her bilateral legs after a mechanical fall yesterday.  Patient tripped while stepping over a curb.  She denies any numbness or weakness of the lower extremities, no saddle anesthesia, no incontinence of bowel or bladder.    Patient was not particularly distressed on examination, she was able to stand and ambulate with no difficulty, no numbness or weakness of the lower extremities, tenderness to palpation  of the lumbar spine as well as the lumbar paraspinal muscles.    Patient's symptoms are consistent with sciatica, though possibility does remain that she may have an acute fracture lumbar spine given recent fall, possible muscle strain.  Not consistent with cauda equina syndrome, epidural hematoma.    CT scan of the lumbar spine did not show any acute abnormalities.  Patient had some improvement in her discomfort with lidocaine, Toradol, Tylenol.    Patient was stable for discharge, I advised that she follow-up with her pain specialist as well as the comprehensive spine program of which a referral was provided, I sent a tapered course of prednisone to the pharmacy and advised her to take this in conjunction with Motrin, Tylenol, lidocaine patches.  Patient was understanding and agreeable to plan.  Strict return precautions given.    Problems Addressed:  Acute bilateral low back pain with bilateral sciatica: acute illness or injury  Fall, initial encounter: acute illness or injury    Amount and/or Complexity of Data Reviewed  Radiology: ordered.    Risk  OTC drugs.  Prescription drug management.          Disposition  Final diagnoses:   Acute bilateral low back pain with bilateral sciatica   Fall, initial encounter     Time reflects when diagnosis was documented in both MDM as applicable and the Disposition within this note       Time User Action Codes Description Comment    6/13/2024  6:32 PM Khoa Levy Add [M54.42,  M54.41] Acute bilateral low back pain with bilateral sciatica     6/13/2024  6:32 PM Khoa Levy Add [W19.XXXA] Fall, initial encounter           ED Disposition       ED Disposition   Discharge    Condition   Stable    Date/Time   u Jun 13, 2024  6:32 PM    Comment   Radha Jessica discharge to home/self care.                   Follow-up Information       Follow up With Specialties Details Why Contact Info Additional Information    [facility]  Schedule an appointment as soon as possible for a visit   [phone]            Patient's Medications   Discharge Prescriptions    PREDNISONE 10 MG TABLET    Take 4 tablets (40 mg total) by mouth daily for 2 days, THEN 2 tablets (20 mg total) daily for 2 days, THEN 1 tablet (10 mg total) daily for 2 days. Do not start before June 14, 2024.       Start Date: 6/14/2024 End Date: 6/20/2024       Order Dose: --       Quantity: 14 tablet    Refills: 0     No discharge procedures on file.    PDMP Review       None             ED Provider  Attending physically available and evaluated Radha Jessica. I managed the patient along with the ED Attending.    Electronically Signed by           Khoa Levy DO  06/13/24 1198

## 2024-06-13 NOTE — DISCHARGE INSTRUCTIONS
Take the steroids as prescribed, alternate between Motrin and Tylenol every 4 hours as needed for pain, you can apply lidocaine patches as well, follow-up with the comprehensive spine program and your pain management specialist, return to the emergency department for any new or worsening symptoms.

## 2024-06-18 NOTE — ED ATTENDING ATTESTATION
6/13/2024  I, Kezia Wright MD, saw and evaluated the patient. I have discussed the patient with the resident/non-physician practitioner and agree with the resident's/non-physician practitioner's findings, Plan of Care, and MDM as documented in the resident's/non-physician practitioner's note, except where noted. All available labs and Radiology studies were reviewed.  I was present for key portions of any procedure(s) performed by the resident/non-physician practitioner and I was immediately available to provide assistance.       At this point I agree with the current assessment done in the Emergency Department.  I have conducted an independent evaluation of this patient a history and physical is as follows:    Patient is a 59-year-old female with history of chronic low back pain who presents to the emergency department for evaluation with increased low lumbar/sacral discomfort following mechanical fall onto this region.  No incontinence, difficulty with urination or defecation afterwards, leg weakness, paresthesias or numbness.    No recent fevers/illness.    Following receipt of acetaminophen, lidocaine and ketorolac she has had improvement in discomfort.    She is able to ambulate steadily and has no abdominal tenderness.    ED Course     CT performed.  No acute fracture.    Reviewed ongoing supportive care.  Will follow-up with her back specialist.    Critical Care Time  Procedures

## 2024-07-01 ENCOUNTER — HOSPITAL ENCOUNTER (OUTPATIENT)
Dept: RADIOLOGY | Facility: HOSPITAL | Age: 59
Discharge: HOME | End: 2024-07-01
Payer: COMMERCIAL

## 2024-07-01 DIAGNOSIS — M54.2 CERVICALGIA: ICD-10-CM

## 2024-07-01 PROCEDURE — 72110 X-RAY EXAM L-2 SPINE 4/>VWS: CPT

## 2024-07-01 PROCEDURE — 72050 X-RAY EXAM NECK SPINE 4/5VWS: CPT

## 2024-07-08 ENCOUNTER — EVALUATION (OUTPATIENT)
Dept: PHYSICAL THERAPY | Facility: CLINIC | Age: 59
End: 2024-07-08
Payer: COMMERCIAL

## 2024-07-08 DIAGNOSIS — M54.41 ACUTE BILATERAL LOW BACK PAIN WITH BILATERAL SCIATICA: Primary | ICD-10-CM

## 2024-07-08 DIAGNOSIS — M54.42 ACUTE BILATERAL LOW BACK PAIN WITH BILATERAL SCIATICA: Primary | ICD-10-CM

## 2024-07-08 PROBLEM — E11.9 DIABETES (HCC): Status: ACTIVE | Noted: 2024-07-08

## 2024-07-08 PROCEDURE — 97161 PT EVAL LOW COMPLEX 20 MIN: CPT | Performed by: PHYSICAL THERAPIST

## 2024-07-08 PROCEDURE — 97110 THERAPEUTIC EXERCISES: CPT | Performed by: PHYSICAL THERAPIST

## 2024-07-08 NOTE — PROGRESS NOTES
PT Evaluation     Today's date: 2024  Patient name: Radha Jessica  : 1965  MRN: [id number]  Referring provider: Diamante Morse, *  Dx:   Encounter Diagnosis     ICD-10-CM    1. Acute bilateral low back pain with bilateral sciatica  M54.42     M54.41                      Assessment  Impairments: abnormal or restricted ROM, activity intolerance, lacks appropriate home exercise program, pain with function, poor posture  and activity limitations  Functional limitations: IADLs; Recreational activities  Symptom irritability: moderate    Assessment details: Patient is a 59 y.o. female referred to PT  with a dx of Low back pain.  Patient reports onset of pain complaints occurred following a fall on 2024.  She has a long standing history of LBP that she was managing quite well until the recent fall.  The pain complaints are currently constant in nature and of moderate to severe in intensity.  The pain complaints are aggravated by prolonged sitting, increased activity and rising from a seated position  She denies bowel and bladder abnormalities and displays no evidence of neurologically mediated weakness.  No other referral appears necessary at this time.      Understanding of Dx/Px/POC: good     Prognosis: good    Goals  Short Term goals - 4 weeks  1.  Patient will be independent and compliant with a HEP.   2.  Patient will report a 50% decrease in pain complaints.     Long Term goals - 8 weeks  1.  Patient will report elimination of pain complaints.  2.  Patient will return to all work related activities without restriction.  3.  Patient will return to all recreational activities without restriction.       Plan  Patient would benefit from: skilled physical therapy    Planned therapy interventions: joint mobilization, neuromuscular re-education, patient/caregiver education, postural training, therapeutic exercise, home exercise program, functional ROM exercises and abdominal trunk  stabilization    Frequency: 1x week  Duration in weeks: 6  Plan of Care beginning date: 2024  Plan of Care expiration date: 2024  Treatment plan discussed with: patient    Subjective Evaluation    History of Present Illness  Date of onset: 2024  Mechanism of injury: Fall onto L side          Not a recurrent problem   Quality of life: good    Patient Goals  Patient goals for therapy: decreased pain, increased motion, increased strength, independence with ADLs/IADLs, return to sport/leisure activities and return to work    Pain  Current pain rating: 3  At best pain rating: 3  At worst pain ratin  Location: Symetrical LB and B anterior thighs  Quality: discomfort and tight  Relieving factors: rest  Aggravating factors: sitting (activity)  Progression: worsening    Social Support    Employment status: working  Treatments  Current treatment: physical therapy    Objective     Concurrent Complaints  Positive for disturbed sleep. Negative for night pain, bladder dysfunction, bowel dysfunction and saddle (S4) numbness    Postural Observations  Seated posture: fair  Correction of posture: has no consistent effect      Active Range of Motion     Lumbar   Flexion:  with pain Restriction level: maximal  Extension:  with pain Restriction level: moderate    Additional Active Range of Motion Details  B side glide - 25% limited.      Joint Play     Hypomobile: L1, L2, L3 and L4     Pain: L1, L2, L3 and L4   Mechanical Assessment    Cervical      Thoracic      Lumbar    Standing extension: repeated movements  Pain location: no change  Pain level: increased           Precautions: DM      Manuals                                                                 Neuro Re-Ed                                                                                                        Ther Ex                                                                                                                     Ther Activity                                        Gait Training                                       Modalities

## 2024-07-18 ENCOUNTER — OFFICE VISIT (OUTPATIENT)
Dept: PHYSICAL THERAPY | Facility: CLINIC | Age: 59
End: 2024-07-18
Payer: COMMERCIAL

## 2024-07-18 DIAGNOSIS — M54.41 ACUTE BILATERAL LOW BACK PAIN WITH BILATERAL SCIATICA: Primary | ICD-10-CM

## 2024-07-18 DIAGNOSIS — M54.42 ACUTE BILATERAL LOW BACK PAIN WITH BILATERAL SCIATICA: Primary | ICD-10-CM

## 2024-07-18 PROCEDURE — 97140 MANUAL THERAPY 1/> REGIONS: CPT | Performed by: PHYSICAL THERAPIST

## 2024-07-18 PROCEDURE — 97110 THERAPEUTIC EXERCISES: CPT | Performed by: PHYSICAL THERAPIST

## 2024-07-18 NOTE — PROGRESS NOTES
Daily Note     Today's date: 2024  Patient name: Radha Jessica  : 1965  MRN: [id number]  Referring provider: Diamante Morse, *  Dx:   Encounter Diagnosis     ICD-10-CM    1. Acute bilateral low back pain with bilateral sciatica  M54.42     M54.41                      Subjective: Reports pain continues to be intermittent      Objective: See treatment diary below      Assessment: Fair tolerance throughout.        Plan: Continue per plan of care.      Precautions: DM      Manuals             Gr II PA mobs L1-L4 BHAVEHS            Supine nerve glides  BHAVESH                                      Neuro Re-Ed             Slouch overcorrect 2x10                                                                                          Ther Ex             Rec bike nv            LTR 5s x10            Pelvic rocks             Seatd ball roll outs - 3 way 5 sec x10                                                   Roopa ext - trial np            Ther Activity                                       Gait Training                                       Modalities

## 2024-07-25 ENCOUNTER — APPOINTMENT (OUTPATIENT)
Dept: PHYSICAL THERAPY | Facility: CLINIC | Age: 59
End: 2024-07-25
Payer: COMMERCIAL

## 2024-08-01 ENCOUNTER — OFFICE VISIT (OUTPATIENT)
Dept: PHYSICAL THERAPY | Facility: CLINIC | Age: 59
End: 2024-08-01
Payer: COMMERCIAL

## 2024-08-01 DIAGNOSIS — M54.42 ACUTE BILATERAL LOW BACK PAIN WITH BILATERAL SCIATICA: Primary | ICD-10-CM

## 2024-08-01 DIAGNOSIS — M54.41 ACUTE BILATERAL LOW BACK PAIN WITH BILATERAL SCIATICA: Primary | ICD-10-CM

## 2024-08-01 PROCEDURE — 97112 NEUROMUSCULAR REEDUCATION: CPT

## 2024-08-01 PROCEDURE — 97110 THERAPEUTIC EXERCISES: CPT

## 2024-08-01 NOTE — PROGRESS NOTES
"Daily Note     Today's date: 2024  Patient name: Radha Jessica  : 1965  MRN: [id number]  Referring provider: Diamante Morse, *  Dx:   Encounter Diagnosis     ICD-10-CM    1. Acute bilateral low back pain with bilateral sciatica  M54.42     M54.41              Daily Note     Today's date: 2024  Patient name: Radha Jessica  : 1965  MRN: [id number]  Referring provider: Diamante Morse, *  Dx:   Encounter Diagnosis     ICD-10-CM    1. Acute bilateral low back pain with bilateral sciatica  M54.42     M54.41                      Subjective:  patient states she is feeling so so today.      Objective: See treatment diary below      Assessment: Tolerated treatment fair. Continued with POC. Good response to RB. Deferred laying prone for manuals, noting she gets uncomfortable in this position. Patient demonstrated fatigue post treatment and would benefit from continued PT      Plan: Continue per plan of care.      Precautions: DM      Manuals            Gr II PA mobs L1-L4 BHAVESH def                        Supine nerve glides  BHAVESH NV                                     Neuro Re-Ed             Slouch overcorrect 2x10 2x10                                                                                         Ther Ex             Rec bike nv 6'           LTR 5s x10 5\" x 10           Pelvic rocks             Seatd ball roll outs - 3 way 5 sec x10 5 sec x 10                                                   Roopa ext - trial np            Ther Activity                                       Gait Training                                       Modalities                                              "

## 2024-08-07 ENCOUNTER — APPOINTMENT (OUTPATIENT)
Dept: PHYSICAL THERAPY | Facility: CLINIC | Age: 59
End: 2024-08-07
Payer: COMMERCIAL

## 2024-08-13 ENCOUNTER — OFFICE VISIT (OUTPATIENT)
Dept: OBGYN CLINIC | Facility: HOSPITAL | Age: 59
End: 2024-08-13
Payer: COMMERCIAL

## 2024-08-13 ENCOUNTER — HOSPITAL ENCOUNTER (OUTPATIENT)
Dept: RADIOLOGY | Facility: HOSPITAL | Age: 59
Discharge: HOME | End: 2024-08-13
Attending: ORTHOPAEDIC SURGERY
Payer: COMMERCIAL

## 2024-08-13 VITALS
BODY MASS INDEX: 29.65 KG/M2 | SYSTOLIC BLOOD PRESSURE: 152 MMHG | HEART RATE: 94 BPM | WEIGHT: 151.01 LBS | HEIGHT: 60 IN | DIASTOLIC BLOOD PRESSURE: 89 MMHG

## 2024-08-13 DIAGNOSIS — M43.16 ANTEROLISTHESIS OF LUMBAR SPINE: ICD-10-CM

## 2024-08-13 DIAGNOSIS — M54.12 CERVICAL RADICULOPATHY: ICD-10-CM

## 2024-08-13 DIAGNOSIS — R52 PAIN: Primary | ICD-10-CM

## 2024-08-13 DIAGNOSIS — R52 PAIN: ICD-10-CM

## 2024-08-13 PROCEDURE — 99214 OFFICE O/P EST MOD 30 MIN: CPT | Performed by: ORTHOPAEDIC SURGERY

## 2024-08-13 PROCEDURE — 72040 X-RAY EXAM NECK SPINE 2-3 VW: CPT

## 2024-08-13 PROCEDURE — 72100 X-RAY EXAM L-S SPINE 2/3 VWS: CPT

## 2024-08-13 NOTE — PROGRESS NOTES
Assessment & Plan/Medical Decision Makin y.o. female with Back Pain and Bilateral Radicular Leg Pain and imaging findings most notable for L4-5 degenerative spondylolisthesis         The clinical, physical and imaging findings were reviewed with the patient.  Radha  has a constellation of findings consistent with Lumbar Radiculopathy in the setting of lumbar degenerative disease.      We discussed the treatment options including physical therapy, at home exercises, activity modifications, chiropractic medicine, oral medications, interventional spine procedures.  At this time recommend continued conservative treatments.     Referral placed for COMPREHENSIVE SPINE PHYSICAL THERAPY to work on core strengthening, lumbar ROM, strengthening, and stretching exercises.    Patient instructed to return to office/ER sooner if symptoms are not improving, getting worse, or new worrisome/neurologic symptoms arise.  Patient will follow up in approx. 3 months for re-evaluation after further conservative treatments.       Subjective:      Chief Complaint: Back Pain    HPI:  Radha Jessica is a 59 y.o. female presenting for initial visit with chief complaint of neck pain and low back pain.  Pain began 1 month ago after sustaining a fall after walking on uneven surface. She states she fell onto her left side which aggravated her low back pain and neck pain.     Describes pain as sharp and burning in nature.  Located in low back.  Denies numbness of lower extremities. She reports numbness of left arm.  Reports burning.  Back pain 70%, Leg pain 30%.  Right> left. Pain is worse with prolonged standing and walking and improves with rest.    Denies any lucas trauma. Denies fever or chills, no night sweats. Denies any bladder or bowel changes.      Conservative therapy includes the following:   Medications: Oxycodone  Injections: Had epidural done in 2024 with Dr. Marcum [facility] pain center states it provided no  relief.     Physical Therapy: she has done PT for her low back  Chiropractic Medicine: has not attempted  Accupunture/Massage Therapy: has not attempted   These therapeutic modalities were ineffective at providing sustained pain relief/functional improvement.     Nicotine dependent: smokes occasionally   Occupation: admin worker  Living situation: Lives with family   ADLs: patient is able to perform     Objective:     Family History   Family history unknown: Yes       Past Medical History:   Diagnosis Date    Anxiety     Arthritis     hands    Asthma     Degenerative lumbar disc     Depression     Diabetes ([facility]) 07/08/2024       Current Outpatient Medications   Medication Sig Dispense Refill    albuterol (2.5 mg/3 mL) 0.083 % nebulizer solution Take 3 mL (2.5 mg total) by nebulization every 4 (four) hours as needed for wheezing or shortness of breath 150 mL 0    albuterol (2.5 mg/3 mL) 0.083 % nebulizer solution Take 3 mL (2.5 mg total) by nebulization every 6 (six) hours as needed for wheezing or shortness of breath 75 mL 0    albuterol (PROVENTIL HFA,VENTOLIN HFA) 90 mcg/act inhaler albuterol sulfate HFA 90 mcg/actuation aerosol inhaler      celecoxib (CeleBREX) 200 mg capsule TAKE 1 CAPSULE BY MOUTH TWICE A DAY WITH FOOD FOR 30 DAYS AS NEEDED      clotrimazole 1 % external solution clotrimazole 1 % topical solution      diclofenac sodium (VOLTAREN) 1 % Apply 2 g topically 4 (four) times a day as needed (Pain) 100 g 0    Diclofenac Sodium (VOLTAREN) 1 % Apply 2 g topically 4 (four) times a day 100 g 1    DULoxetine HCl 30 MG CSDR duloxetine 30 mg capsule,delayed release      fluticasone (FLOVENT HFA) 110 MCG/ACT inhaler Flovent  mcg/actuation aerosol inhaler      methocarbamol (ROBAXIN) 500 mg tablet Take 1 tablet (500 mg total) by mouth 2 (two) times a day as needed for muscle spasms 20 tablet 0    naproxen (NAPROSYN) 500 mg tablet Take 1 tablet (500 mg total) by mouth 2 (two) times a day with meals 60  tablet 0    oxyCODONE (ROXICODONE) 5 immediate release tablet TAKE 2 TABLETS BY MOUTH 3 TIMES A DAY AS NEEDED FOR PAIN      oxyCODONE-acetaminophen (PERCOCET) 5-325 mg per tablet oxycodone-acetaminophen 5 mg-325 mg tablet      pregabalin (LYRICA) 50 mg capsule Take 50 mg by mouth daily       No current facility-administered medications for this visit.       Past Surgical History:   Procedure Laterality Date     SECTION         Social History     Socioeconomic History    Marital status: Single     Spouse name: Not on file    Number of children: Not on file    Years of education: Not on file    Highest education level: Not on file   Occupational History    Not on file   Tobacco Use    Smoking status: Some Days     Current packs/day: 0.50     Average packs/day: 0.5 packs/day for 30.0 years (15.0 ttl pk-yrs)     Types: Cigarettes    Smokeless tobacco: Never   Vaping Use    Vaping status: Never Used   Substance and Sexual Activity    Alcohol use: Not Currently    Drug use: Never    Sexual activity: Not on file   Other Topics Concern    Not on file   Social History Narrative    Not on file     Social Determinants of Health     Financial Resource Strain: Not on file   Food Insecurity: Not on file   Transportation Needs: Not on file   Physical Activity: Not on file   Stress: Not on file   Social Connections: Not on file   Intimate Partner Violence: Not on file   Housing Stability: Not on file       No Known Allergies    Review of Systems  General- denies fever/chills  HEENT- denies hearing loss or sore throat  Eyes- denies eye pain or visual disturbances, denies red eyes  Respiratory- denies cough or SOB  Cardio- denies chest pain or palpitations  GI- denies abdominal pain  Endocrine- denies urinary frequency  Urinary- denies pain with urination  Musculoskeletal- Negative except noted above  Skin- denies rashes or wounds  Neurological- denies dizziness or headache  Psychiatric- denies anxiety or difficulty  concentrating    Physical Exam  /89   Pulse 94   Ht 5' (1.524 m)   Wt 68.5 kg (151 lb 0.2 oz)   BMI 29.49 kg/m²     General/Constitutional: No apparent distress: well-nourished and well developed.  Lymphatic: No appreciable lymphadenopathy  Respiratory: Non-labored breathing  Vascular: No edema, swelling or tenderness, except as noted in detailed exam.  Integumentary: No impressive skin lesions present, except as noted in detailed exam.  Psych: Normal mood and affect, oriented to person, place and time.  MSK: normal other than stated in HPI and exam  Gait & balance: no evidence of myelopathic gait, ambulates Independently     Lumbar spine range of motion:  -Forward flexion to 90  -Extension to neutral  -Lateral bend 25 right, 25 left  -Rotation 25 right, 25 left  There tenderness with palpation along lumbar paraspinal musculature, no midline tenderness     Neurologic:    Lower Extremity Motor Function    Right  Left    Iliopsoas  5/5  5/5    Quadriceps 5/5 5/5   Tibialis anterior  5/5  5/5    EHL  5/5  5/5    Gastroc. muscle  5/5  5/5    Heel rise  5/5  5/5    Toe rise  5/5  5/5      Sensory: light touch is intact to bilateral upper and lower extremities     Reflexes:    Right Left   Patellar 1+ 1+   Achilles 1+ 1+   Babinski neg neg     Other tests:  Straight Leg Raise: negative  Delonte SI: positive  ANNA SI: negative  Greater troch: no tenderness   Internal/external hip ROM: intact, no pain   Flexion/extension knee ROM: intact, no pain   Vascular: WWP extremities, 2+DP bilateral      Diagnostic Tests   IMAGING: I have personally reviewed the images and these are my findings:  Lumbar Spine X-rays from 8/13/24: multi level lumbar spondylosis with loss of disc height, osteophyte formation and facet hypertrophy, L4-5 spondylolisthesis, no appreciated lytic/blastic lesions, no obvious instability    Cervical Spine X-Rays from 8/13/24: multi level lumbar disc degeneration with disc desiccation, mild loss of  "disc height, anterior osteophytes, most noted at C4-5    Procedures, if performed today     None performed       Portions of the record may have been created with voice recognition software.  Occasional wrong word or \"sound a like\" substitutions may have occurred due to the inherent limitations of voice recognition software.  Read the chart carefully and recognize, using context, where substitutions have occurred.    "

## 2024-08-20 ENCOUNTER — OFFICE VISIT (OUTPATIENT)
Dept: PHYSICAL THERAPY | Facility: CLINIC | Age: 59
End: 2024-08-20
Payer: COMMERCIAL

## 2024-08-20 DIAGNOSIS — M54.41 ACUTE BILATERAL LOW BACK PAIN WITH BILATERAL SCIATICA: Primary | ICD-10-CM

## 2024-08-20 DIAGNOSIS — M54.42 ACUTE BILATERAL LOW BACK PAIN WITH BILATERAL SCIATICA: Primary | ICD-10-CM

## 2024-08-20 PROCEDURE — 97110 THERAPEUTIC EXERCISES: CPT

## 2024-08-20 NOTE — PROGRESS NOTES
"Daily Note     Today's date: 2024  Patient name: Radha Jessica  : 1965  MRN: [id number]  Referring provider: Diamante Morse, *  Dx:   Encounter Diagnosis     ICD-10-CM    1. Acute bilateral low back pain with bilateral sciatica  M54.42     M54.41              Daily Note     Today's date: 2024  Patient name: Radha Jessica  : 1965  MRN: [id number]  Referring provider: Diamante Morse, *  Dx:   Encounter Diagnosis     ICD-10-CM    1. Acute bilateral low back pain with bilateral sciatica  M54.42     M54.41           Start Time: 1200  Stop Time: 1246  Total time in clinic (min): 46 minutes    Subjective:  Pt reports that her LBP continues. Has new script for CS as well. Finds the LTR exercise the most helpful for her symptoms.       Objective: See treatment diary below      Assessment: Tolerated treatment fair.  No increase in symptoms throughout. Patient would benefit from continued PT      Plan: Continue per plan of care.      Precautions: DM      Manuals           Gr II PA mobs L1-L4 BHAVESH def                        Supine nerve glides  BHAVESH NV                                     Neuro Re-Ed             Slouch overcorrect 2x10 2x10 2x10                                                                                         Ther Ex             Rec bike nv 6' 6 min           LTR 5s x10 5\" x 10 5\"x20          Pelvic rocks   10x           Seatd ball roll outs - 3 way 5 sec x10 5 sec x 10  5sec x10                                                  Roopa ext - trial np            Ther Activity                                       Gait Training                                       Modalities                                              "

## 2024-08-29 ENCOUNTER — EVALUATION (OUTPATIENT)
Dept: PHYSICAL THERAPY | Facility: CLINIC | Age: 59
End: 2024-08-29
Payer: COMMERCIAL

## 2024-08-29 DIAGNOSIS — M54.41 ACUTE BILATERAL LOW BACK PAIN WITH BILATERAL SCIATICA: Primary | ICD-10-CM

## 2024-08-29 DIAGNOSIS — M54.42 ACUTE BILATERAL LOW BACK PAIN WITH BILATERAL SCIATICA: Primary | ICD-10-CM

## 2024-08-29 PROCEDURE — 97164 PT RE-EVAL EST PLAN CARE: CPT | Performed by: PHYSICAL THERAPIST

## 2024-08-29 PROCEDURE — 97110 THERAPEUTIC EXERCISES: CPT | Performed by: PHYSICAL THERAPIST

## 2024-08-29 NOTE — PROGRESS NOTES
PT Evaluation     Today's date: 2024  Patient name: Radha Jessica  : 1965  MRN: [id number]  Referring provider: Diamante Morse, *  Dx:   No diagnosis found.                 Assessment  Impairments: abnormal or restricted ROM, activity intolerance, lacks appropriate home exercise program, pain with function, poor posture  and activity limitations  Functional limitations: IADLs; Recreational activities  Symptom irritability: low    Assessment details: Patient making slow steady progress towards established goals.  Will continue to benefit from skilled PT.      Understanding of Dx/Px/POC: good     Prognosis: good    Goals  Short Term goals - 4 weeks  1.  Patient will be independent and compliant with a HEP. MET  2.  Patient will report a 50% decrease in pain complaints. PARTIALLY MET    Long Term goals - 8 weeks  1.  Patient will report elimination of pain complaints. NOT MET  2.  Patient will return to all IADLs without restriction. PARTIALLY MET  3.  Patient will return to all recreational activities without restriction. NOT MET       Plan  Patient would benefit from: skilled physical therapy    Planned therapy interventions: joint mobilization, neuromuscular re-education, patient/caregiver education, postural training, therapeutic exercise, home exercise program, functional ROM exercises and abdominal trunk stabilization    Frequency: 1x week  Duration in weeks: 6  Plan of Care beginning date: 2024  Plan of Care expiration date: 10/28/2024  Treatment plan discussed with: patient    Subjective Evaluation    History of Present Illness  Date of onset: 2024  Mechanism of injury: Fall onto L side          Not a recurrent problem   Quality of life: good    Patient Goals  Patient goals for therapy: decreased pain, increased motion, increased strength, independence with ADLs/IADLs, return to sport/leisure activities and return to work    Pain  Current pain ratin  At best pain rating:  "3  At worst pain ratin  Location: Symetrical LB and B anterior thighs  Quality: discomfort and tight  Relieving factors: rest  Aggravating factors: sitting (activity)  Progression: worsening    Social Support    Employment status: working  Treatments  Current treatment: physical therapy    Objective     Concurrent Complaints  Positive for disturbed sleep. Negative for night pain, bladder dysfunction, bowel dysfunction and saddle (S4) numbness    Postural Observations  Seated posture: good (Using roll)  Correction of posture: makes symptoms better      Active Range of Motion     Lumbar   Flexion:  with pain Restriction level: minimal  Extension:  with pain Restriction level: moderate    Additional Active Range of Motion Details  B side glide - WNL.      Joint Play     Hypomobile: L1, L2, L3 and L4     Pain: L1, L2, L3 and L4   Mechanical Assessment    Cervical      Thoracic      Lumbar    Standing extension: repeated movements  Pain location: no change  Pain level: increased             Precautions: DM    Manuals                Gr II PA mobs L1-L4 BHAVESH def    BHAVESH                                       Supine nerve glides  BHAVESH NV                                                                   Neuro Re-Ed                       Slouch overcorrect 2x10 2x10 2x10   2x10                                                                                                                                                               Ther Ex                       Rec bike nv 6' 6 min   4'               LTR 5s x10 5\" x 10 5\"x20  5\" x20               Pelvic rocks     10x   2x10               Seatd ball roll outs - 3 way 5 sec x10 5 sec x 10  5sec x10   5s x10                                                                                       Roopa ext - trial np                     Ther Activity                                                                       Gait Training                                    "                                    Modalities

## 2024-09-05 ENCOUNTER — EVALUATION (OUTPATIENT)
Dept: PHYSICAL THERAPY | Facility: CLINIC | Age: 59
End: 2024-09-05
Payer: COMMERCIAL

## 2024-09-05 DIAGNOSIS — M43.16 ANTEROLISTHESIS OF LUMBAR SPINE: ICD-10-CM

## 2024-09-05 DIAGNOSIS — M54.12 CERVICAL RADICULOPATHY: Primary | ICD-10-CM

## 2024-09-05 PROCEDURE — 97110 THERAPEUTIC EXERCISES: CPT | Performed by: PHYSICAL THERAPIST

## 2024-09-05 PROCEDURE — 97161 PT EVAL LOW COMPLEX 20 MIN: CPT | Performed by: PHYSICAL THERAPIST

## 2024-09-05 NOTE — PROGRESS NOTES
PT Evaluation     Today's date: 2024  Patient name: Radha Jessica  : 1965  MRN: [id number]  Referring provider: Declan White MD  Dx:   Encounter Diagnosis     ICD-10-CM    1. Anterolisthesis of lumbar spine  M43.16 Ambulatory Referral to [facility]      2. Cervical radiculopathy  M54.12 Ambulatory Referral to [facility]                     Assessment  Impairments: abnormal or restricted ROM, pain with function and poor posture   Functional limitations: IADLs; Recreational activities  Symptom irritability: moderate    Assessment details: Patient is a 59 y.o. female referred to PT with a dx of neck pain.  Patient reports onset of pain complaints occurred after a fall in .  The pain complaints are currently intermittent in nature and of moderate intensity.  Aggravating factors of her pain include cervical rotation and flexion and increased activity.  She displays no evidence of neurologically mediated weakness.  No other referral appears necessary at this time.     Understanding of Dx/Px/POC: good     Prognosis: good    Goals  Short Term goals - 4 weeks  1.  Patient will be independent and compliant with a HEP.   2.  Patient will report a 50% decrease in pain complaints.     Long Term goals - 8 weeks  1.  Patient will report elimination of pain complaints.  2.  Patient will return to all IADLs without restriction.  3.  Patient will return to all recreational activities without restriction.       Plan  Patient would benefit from: skilled physical therapy    Planned therapy interventions: joint mobilization, manual therapy, neuromuscular re-education, postural training, patient/caregiver education, therapeutic exercise and home exercise program    Frequency: 1x week  Duration in weeks: 8  Plan of Care beginning date: 2024  Plan of Care expiration date: 2024  Treatment plan discussed with: patient    Subjective Evaluation    History of Present Illness          Not  "a recurrent problem   Quality of life: good    Patient Goals  Patient goals for therapy: decreased pain, increased motion, increased strength, independence with ADLs/IADLs and return to sport/leisure activities    Pain  Current pain ratin  At best pain ratin  At worst pain ratin  Location: L lateral c/spine with associated L UE pain and headache  Relieving factors: rest  Progression: no change    Treatments  Current treatment: physical therapy    Objective     Concurrent Complaints  Positive for headaches. Negative for night pain and disturbed sleep    Postural Observations  Seated posture: fair  Correction of posture: has no consistent effect      Active Range of Motion   Cervical/Thoracic Spine       Cervical    Subcranial retraction:   Restriction level: moderate  Flexion:  Restriction level: moderate  Extension:  Restriction level: moderate  Left lateral flexion:  Restriction level: moderate  Right lateral flexion:  Restriction level moderate  Left rotation:  Restriction level: moderate  Right rotation:  Restriction level: moderate  Mechanical Assessment    Cervical    Seated retraction: repeated movements   Pain intensity: better  Pain level: decreased    Thoracic      Lumbar    Neuro Exam:     Headaches   Patient reports headaches: Yes.            Precautions: DM     Manuals                Gr II PA mobs L1-L4 BHAVESH def    BHAVESH                                       Supine nerve glides  BHAVESH NV                                                                   Neuro Re-Ed                       Slouch overcorrect 2x10 2x10 2x10   2x10                                                                                                                                                               Ther Ex                       Rec bike nv 6' 6 min   4'               LTR 5s x10 5\" x 10 5\"x20  5\" x20               Pelvic rocks     10x   2x10               Seatd ball roll outs - 3 way 5 sec x10 5 sec " x 10  5sec x10   5s x10                                                                                       Roopa ext - trial np                     Ther Activity                                                                       Gait Training                                                                       Modalities

## 2024-09-26 ENCOUNTER — OFFICE VISIT (OUTPATIENT)
Dept: PHYSICAL THERAPY | Facility: CLINIC | Age: 59
End: 2024-09-26
Payer: COMMERCIAL

## 2024-09-26 DIAGNOSIS — M54.41 ACUTE BILATERAL LOW BACK PAIN WITH BILATERAL SCIATICA: ICD-10-CM

## 2024-09-26 DIAGNOSIS — M54.12 CERVICAL RADICULOPATHY: Primary | ICD-10-CM

## 2024-09-26 DIAGNOSIS — M54.42 ACUTE BILATERAL LOW BACK PAIN WITH BILATERAL SCIATICA: ICD-10-CM

## 2024-09-26 PROCEDURE — 97110 THERAPEUTIC EXERCISES: CPT | Performed by: PHYSICAL THERAPIST

## 2024-09-26 PROCEDURE — 97140 MANUAL THERAPY 1/> REGIONS: CPT | Performed by: PHYSICAL THERAPIST

## 2024-09-26 NOTE — PROGRESS NOTES
"Daily Note     Today's date: 2024  Patient name: Radha Jessica  : 1965  MRN: [id number]  Referring provider: Declan White MD  Dx:   Encounter Diagnosis     ICD-10-CM    1. Cervical radiculopathy  M54.12       2. Acute bilateral low back pain with bilateral sciatica  M54.42     M54.41                      Subjective: Neck pain improved - has been away on vacation past 2 weeks      Objective: See treatment diary below      Assessment: Mobility improved in cspine, however, guarded l/spine mobility       Plan: Continue per plan of care.      Precautions: DM     Manuals              Gr II PA mobs L1-L4 BHAVESH def    BHAVESH                                       Supine nerve glides  BHAVESH NV                    man traction         BHAVESH                                     Neuro Re-Ed                       Slouch overcorrect 2x10 2x10 2x10   2x10  2x10              Retraction         2x10              Retraction with ext         2x10                                                                                                             Ther Ex                       Rec bike nv 6' 6 min   4'  np             LTR 5s x10 5\" x 10 5\"x20  5\" x20  5\" x20             Pelvic rocks     10x   2x10  2x10             Seatd ball roll outs - 3 way 5 sec x10 5 sec x 10  5sec x10   5s x10  5s x10                                                                                     Roopa ext - trial np                     Ther Activity                                                                       Gait Training                                                                       Modalities                                                                              "

## 2024-10-03 ENCOUNTER — OFFICE VISIT (OUTPATIENT)
Dept: PHYSICAL THERAPY | Facility: CLINIC | Age: 59
End: 2024-10-03
Payer: COMMERCIAL

## 2024-10-03 DIAGNOSIS — M54.12 CERVICAL RADICULOPATHY: Primary | ICD-10-CM

## 2024-10-03 PROCEDURE — 97110 THERAPEUTIC EXERCISES: CPT | Performed by: PHYSICAL THERAPIST

## 2024-10-03 PROCEDURE — 97140 MANUAL THERAPY 1/> REGIONS: CPT | Performed by: PHYSICAL THERAPIST

## 2024-10-03 NOTE — PROGRESS NOTES
"Daily Note     Today's date: 10/3/2024  Patient name: Radha Jessica  : 1965  MRN: [id number]  Referring provider: Declan White MD  Dx:   Encounter Diagnosis     ICD-10-CM    1. Cervical radiculopathy  M54.12                      Subjective: Reporting continued headaches and actually was effected by light      Objective: See treatment diary below      Assessment: Focused put on movement without fear and to progress movement as tolerated.         Plan: Continue per plan of care.      Precautions: DM     Manuals 7/18 8/1 08/20  8/29  9/26  10/3           Gr II PA mobs L1-L4 BHAVESH def    BHAVESH                                       Supine nerve glides  BHAVESH NV                    man traction         BHAVESH  BHAVESH                                   Neuro Re-Ed                       Slouch overcorrect 2x10 2x10 2x10   2x10  2x10  2x10            Retraction         2x10  2x10            Retraction with ext         2x10  2x10                                                                                                           Ther Ex                       Rec bike nv 6' 6 min   4'  np             LTR 5s x10 5\" x 10 5\"x20  5\" x20  5\" x20  5s x20           Pelvic rocks     10x   2x10  2x10  np           Seatd ball roll outs - 3 way 5 sec x10 5 sec x 10  5sec x10   5s x10  5s x10  10s x10                                                                                   Roopa ext - trial np                     Ther Activity                                                                       Gait Training                                                                       Modalities                                                                                "

## 2024-10-10 ENCOUNTER — OFFICE VISIT (OUTPATIENT)
Dept: PHYSICAL THERAPY | Facility: CLINIC | Age: 59
End: 2024-10-10
Payer: COMMERCIAL

## 2024-10-10 DIAGNOSIS — M54.12 CERVICAL RADICULOPATHY: Primary | ICD-10-CM

## 2024-10-10 DIAGNOSIS — M54.41 ACUTE BILATERAL LOW BACK PAIN WITH BILATERAL SCIATICA: ICD-10-CM

## 2024-10-10 DIAGNOSIS — M54.42 ACUTE BILATERAL LOW BACK PAIN WITH BILATERAL SCIATICA: ICD-10-CM

## 2024-10-10 PROCEDURE — 97110 THERAPEUTIC EXERCISES: CPT | Performed by: PHYSICAL THERAPIST

## 2024-10-10 NOTE — PROGRESS NOTES
"Daily Note     Today's date: 10/10/2024  Patient name: Radha Jessica  : 1965  MRN: [id number]  Referring provider: Declan White MD  Dx:   Encounter Diagnosis     ICD-10-CM    1. Cervical radiculopathy  M54.12       2. Acute bilateral low back pain with bilateral sciatica  M54.42     M54.41                      Subjective: Reports doing very well over the past few       Objective: See treatment diary below      Assessment: Does not tolerate supine lying for prolonged periods       Plan: Continue per plan of care.      Precautions: DM     Manuals 7/18 8/1 08/20  8/29  9/26  10/3  10/10         Gr II PA mobs L1-L4 BHAVESH def    BHAVESH                                       Supine nerve glides  BHAVESH NV                    man traction         BHAVESH  BHAVESH  BHAVESH                                 Neuro Re-Ed                       Slouch overcorrect 2x10 2x10 2x10   2x10  2x10  2x10  2x10          Retraction         2x10  2x10  supine x15          Retraction with ext         2x10  2x10  x10                                                                                                         Ther Ex                       Rec bike nv 6' 6 min   4'  np    nv         LTR 5s x10 5\" x 10 5\"x20  5\" x20  5\" x20  5s x20  5s x20         Pelvic rocks     10x   2x10  2x10  np  2x10         Seatd ball roll outs - 3 way 5 sec x10 5 sec x 10  5sec x10   5s x10  5s x10  10s x10  10s x10                                                                                 Roopa ext - trial np                     Ther Activity                                                                       Gait Training                                                                       Modalities                                                                                  "

## 2024-10-17 ENCOUNTER — OFFICE VISIT (OUTPATIENT)
Dept: PHYSICAL THERAPY | Facility: CLINIC | Age: 59
End: 2024-10-17
Payer: COMMERCIAL

## 2024-10-17 DIAGNOSIS — M54.42 ACUTE BILATERAL LOW BACK PAIN WITH BILATERAL SCIATICA: ICD-10-CM

## 2024-10-17 DIAGNOSIS — M54.12 CERVICAL RADICULOPATHY: Primary | ICD-10-CM

## 2024-10-17 DIAGNOSIS — M54.41 ACUTE BILATERAL LOW BACK PAIN WITH BILATERAL SCIATICA: ICD-10-CM

## 2024-10-17 PROCEDURE — 97140 MANUAL THERAPY 1/> REGIONS: CPT

## 2024-10-17 PROCEDURE — 97110 THERAPEUTIC EXERCISES: CPT

## 2024-10-17 NOTE — PROGRESS NOTES
"Daily Note     Today's date: 10/17/2024  Patient name: Radha Jessica  : 1965  MRN: [id number]  Referring provider: Declan White MD  Dx:   Encounter Diagnosis     ICD-10-CM    1. Cervical radiculopathy  M54.12       2. Acute bilateral low back pain with bilateral sciatica  M54.42     M54.41             Start Time: 1630  Stop Time: 1700  Total time in clinic (min): 30 minutes    Subjective: Patient states that lower back and neck ar mod sore. She states she is scheduled for a shot for c-s soon.      Objective: See treatment diary below      Assessment: Continues with poor tolerance to supine position. Mod L-s pain t/o session and c-s.       Plan: Continue per plan of care.      Precautions: DM     Manuals 7/18 8/1 08/20  8/29  9/26  10/3  10/10  10/17       Gr II PA mobs L1-L4 BHAVESH def    BHAVESH                                       Supine nerve glides  BHAVESH NV                    man traction         BHAVESH  BHAVESH  BHAVESH  LQ                               Neuro Re-Ed                       Slouch overcorrect 2x10 2x10 2x10   2x10  2x10  2x10  2x10  2x10        Retraction         2x10  2x10  supine x15  declined        Retraction with ext         2x10  2x10  x10  x15                                                                                                       Ther Ex                       Rec bike nv 6' 6 min   4'  np    nv  5 min       LTR 5s x10 5\" x 10 5\"x20  5\" x20  5\" x20  5s x20  5s x20  5sx20       Pelvic rocks     10x   2x10  2x10  np  2x10  2x10       Seatd ball roll outs - 3 way 5 sec x10 5 sec x 10  5sec x10   5s x10  5s x10  10s x10  10s x10  10sx10                                                                               Roopa ext - trial np                     Ther Activity                                                                       Gait Training                                                                       Modalities                                                                 "

## 2024-10-24 ENCOUNTER — OFFICE VISIT (OUTPATIENT)
Dept: PHYSICAL THERAPY | Facility: CLINIC | Age: 59
End: 2024-10-24
Payer: COMMERCIAL

## 2024-10-24 DIAGNOSIS — M54.42 ACUTE BILATERAL LOW BACK PAIN WITH BILATERAL SCIATICA: ICD-10-CM

## 2024-10-24 DIAGNOSIS — M54.12 CERVICAL RADICULOPATHY: Primary | ICD-10-CM

## 2024-10-24 DIAGNOSIS — M54.41 ACUTE BILATERAL LOW BACK PAIN WITH BILATERAL SCIATICA: ICD-10-CM

## 2024-10-24 PROCEDURE — 97110 THERAPEUTIC EXERCISES: CPT | Performed by: PHYSICAL THERAPIST

## 2024-10-24 PROCEDURE — 97140 MANUAL THERAPY 1/> REGIONS: CPT | Performed by: PHYSICAL THERAPIST

## 2024-10-24 NOTE — PROGRESS NOTES
"Daily Note     Today's date: 10/24/2024  Patient name: Radha Jessica  : 1965  MRN: [id number]  Referring provider: Declan White MD  Dx:   Encounter Diagnosis     ICD-10-CM    1. Cervical radiculopathy  M54.12       2. Acute bilateral low back pain with bilateral sciatica  M54.42     M54.41                      Subjective: Increased neck pain today for no apparent reason - received injection 3 days ago without relief yet.        Objective: See treatment diary below      Assessment: Neck ROM continues to be restricted in all planes and painful.  Instructed patient to initiate cervical retraction x10 reps every 2 hours until follow up next week.       Plan: Continue per plan of care.      Precautions: DM     Manuals 7/18 8/1 08/20  8/29  9/26  10/3  10/10  10/17  10/24     Gr II PA mobs L1-L4 BHAVESH def    HBAVESH                                       Supine nerve glides  BHAVESH NV                    man traction         BHAVESH  BHAVESH  BHAVESH  LQ  BHAVESH     C/spine snag                 BHAVESH     Neuro Re-Ed                       Slouch overcorrect 2x10 2x10 2x10   2x10  2x10  2x10  2x10  2x10        Retraction         2x10  2x10  supine x15  declined  2x10      Retraction with ext         2x10  2x10  x10  x15                                                                                                       Ther Ex                       Rec bike nv 6' 6 min   4'  np    nv  5 min       LTR 5s x10 5\" x 10 5\"x20  5\" x20  5\" x20  5s x20  5s x20  5sx20       Pelvic rocks     10x   2x10  2x10  np  2x10  2x10       Seatd ball roll outs - 3 way 5 sec x10 5 sec x 10  5sec x10   5s x10  5s x10  10s x10  10s x10  10sx10                                                                               Roopa ext - trial np                     Ther Activity                                                                       Gait Training                                                                       Modalities                                "

## 2024-11-05 ENCOUNTER — OFFICE VISIT (OUTPATIENT)
Dept: OBGYN CLINIC | Facility: HOSPITAL | Age: 59
End: 2024-11-05
Payer: COMMERCIAL

## 2024-11-05 VITALS
WEIGHT: 151.01 LBS | DIASTOLIC BLOOD PRESSURE: 84 MMHG | SYSTOLIC BLOOD PRESSURE: 161 MMHG | HEART RATE: 86 BPM | BODY MASS INDEX: 29.65 KG/M2 | HEIGHT: 60 IN

## 2024-11-05 DIAGNOSIS — M54.12 CERVICAL RADICULOPATHY: ICD-10-CM

## 2024-11-05 DIAGNOSIS — M43.16 ANTEROLISTHESIS OF LUMBAR SPINE: Primary | ICD-10-CM

## 2024-11-05 PROCEDURE — 99213 OFFICE O/P EST LOW 20 MIN: CPT | Performed by: ORTHOPAEDIC SURGERY

## 2024-11-05 NOTE — PROGRESS NOTES
Assessment & Plan/Medical Decision Makin y.o. female with Back Pain and Bilateral Radicular Leg Pain and imaging findings most notable for L4-5 degenerative spondylolisthesis         The clinical, physical and imaging findings were reviewed with the patient.  Radha  has a constellation of findings consistent with Lumbar Radiculopathy in the setting of lumbar degenerative disease.   Also with worsening neck pain and headaches, cervical radiculopathy.    We discussed the treatment options including physical therapy, at home exercises, activity modifications, chiropractic medicine, oral medications, interventional spine procedures.  At this time recommend continued conservative treatments.     MRI cervical spine to further evaluate patient's symptoms. Will review MRI in detail with patient at follow-up visit and discuss further treatment options at that time.     Patient instructed to return to office/ER sooner if symptoms are not improving, getting worse, or new worrisome/neurologic symptoms arise.  Patient will follow up after cervical MRI.     Subjective:      Chief Complaint: Back Pain    HPI:  Radha Jessica is a 59 y.o. female presenting for initial visit with chief complaint of neck pain and low back pain.  Pain began 1 month ago after sustaining a fall after walking on uneven surface. She states she fell onto her left side which aggravated her low back pain and neck pain.     Describes pain as sharp and burning in nature.  Located in low back.  Denies numbness of lower extremities. She reports numbness of left arm.  Reports burning.  Back pain 70%, Leg pain 30%.  Right> left. Pain is worse with prolonged standing and walking and improves with rest.    Denies any lucas trauma. Denies fever or chills, no night sweats. Denies any bladder or bowel changes.      Conservative therapy includes the following:   Medications: Oxycodone  Injections: Had epidural done in 2024 with Dr. Jossue feliz  pain center states it provided no relief.     Physical Therapy: she has done PT for her low back  Chiropractic Medicine: has not attempted  Accupunture/Massage Therapy: has not attempted   These therapeutic modalities were ineffective at providing sustained pain relief/functional improvement.     Nicotine dependent: smokes occasionally   Occupation: admin worker  Living situation: Lives with family   ADLs: patient is able to perform     11/5/24 Update  Patient is here for follow up.   Has been doing physical therapy for back and neck.  Continues with pain.  Now has worsening headaches.  Recently had injection in October Dr. Marcum and will have next shot Nov 11.   Notes pain with walking in gluteal region. +shopping cart sign.   Has had about 6 lumbar injections including epidurals which only temporarily helped.      Objective:     Family History   Family history unknown: Yes       Past Medical History:   Diagnosis Date    Anxiety     Arthritis     hands    Asthma     Degenerative lumbar disc     Depression     Diabetes (HCC) 07/08/2024       Current Outpatient Medications   Medication Sig Dispense Refill    albuterol (2.5 mg/3 mL) 0.083 % nebulizer solution Take 3 mL (2.5 mg total) by nebulization every 4 (four) hours as needed for wheezing or shortness of breath 150 mL 0    albuterol (2.5 mg/3 mL) 0.083 % nebulizer solution Take 3 mL (2.5 mg total) by nebulization every 6 (six) hours as needed for wheezing or shortness of breath 75 mL 0    albuterol (PROVENTIL HFA,VENTOLIN HFA) 90 mcg/act inhaler albuterol sulfate HFA 90 mcg/actuation aerosol inhaler      celecoxib (CeleBREX) 200 mg capsule TAKE 1 CAPSULE BY MOUTH TWICE A DAY WITH FOOD FOR 30 DAYS AS NEEDED      clotrimazole 1 % external solution clotrimazole 1 % topical solution      diclofenac sodium (VOLTAREN) 1 % Apply 2 g topically 4 (four) times a day as needed (Pain) 100 g 0    Diclofenac Sodium (VOLTAREN) 1 % Apply 2 g topically 4 (four) times a day 100 g 1     DULoxetine HCl 30 MG CSDR duloxetine 30 mg capsule,delayed release      fluticasone (FLOVENT HFA) 110 MCG/ACT inhaler Flovent  mcg/actuation aerosol inhaler      methocarbamol (ROBAXIN) 500 mg tablet Take 1 tablet (500 mg total) by mouth 2 (two) times a day as needed for muscle spasms 20 tablet 0    naproxen (NAPROSYN) 500 mg tablet Take 1 tablet (500 mg total) by mouth 2 (two) times a day with meals 60 tablet 0    oxyCODONE (ROXICODONE) 5 immediate release tablet TAKE 2 TABLETS BY MOUTH 3 TIMES A DAY AS NEEDED FOR PAIN      oxyCODONE-acetaminophen (PERCOCET) 5-325 mg per tablet oxycodone-acetaminophen 5 mg-325 mg tablet      pregabalin (LYRICA) 50 mg capsule Take 50 mg by mouth daily       No current facility-administered medications for this visit.       Past Surgical History:   Procedure Laterality Date     SECTION         Social History     Socioeconomic History    Marital status: Single     Spouse name: Not on file    Number of children: Not on file    Years of education: Not on file    Highest education level: Not on file   Occupational History    Not on file   Tobacco Use    Smoking status: Some Days     Current packs/day: 0.50     Average packs/day: 0.5 packs/day for 30.0 years (15.0 ttl pk-yrs)     Types: Cigarettes    Smokeless tobacco: Never   Vaping Use    Vaping status: Never Used   Substance and Sexual Activity    Alcohol use: Not Currently    Drug use: Never    Sexual activity: Not on file   Other Topics Concern    Not on file   Social History Narrative    Not on file     Social Determinants of Health     Financial Resource Strain: Not on file   Food Insecurity: Not on file   Transportation Needs: Not on file   Physical Activity: Not on file   Stress: Not on file   Social Connections: Not on file   Intimate Partner Violence: Not on file   Housing Stability: Not on file       No Known Allergies    Review of Systems  General- denies fever/chills  HEENT- denies hearing loss or sore  throat  Eyes- denies eye pain or visual disturbances, denies red eyes  Respiratory- denies cough or SOB  Cardio- denies chest pain or palpitations  GI- denies abdominal pain  Endocrine- denies urinary frequency  Urinary- denies pain with urination  Musculoskeletal- Negative except noted above  Skin- denies rashes or wounds  Neurological- denies dizziness or headache  Psychiatric- denies anxiety or difficulty concentrating    Physical Exam  /84   Pulse 86   Ht 5' (1.524 m)   Wt 68.5 kg (151 lb 0.2 oz)   BMI 29.49 kg/m²     General/Constitutional: No apparent distress: well-nourished and well developed.  Lymphatic: No appreciable lymphadenopathy  Respiratory: Non-labored breathing  Vascular: No edema, swelling or tenderness, except as noted in detailed exam.  Integumentary: No impressive skin lesions present, except as noted in detailed exam.  Psych: Normal mood and affect, oriented to person, place and time.  MSK: normal other than stated in HPI and exam  Gait & balance: no evidence of myelopathic gait, ambulates Independently     Lumbar spine range of motion:  -Forward flexion to 90  -Extension to neutral  -Lateral bend 25 right, 25 left  -Rotation 25 right, 25 left  There tenderness with palpation along lumbar paraspinal musculature, no midline tenderness     Neurologic:    Lower Extremity Motor Function    Right  Left    Iliopsoas  5/5  5/5    Quadriceps 5/5 5/5   Tibialis anterior  5/5  5/5    EHL  5/5  5/5    Gastroc. muscle  5/5  5/5    Heel rise  5/5  5/5    Toe rise  5/5  5/5      Sensory: light touch is intact to bilateral upper and lower extremities     Reflexes:    Right Left   Patellar 1+ 1+   Achilles 1+ 1+   Babinski neg neg     Other tests:  Straight Leg Raise: negative  Delonte SI: positive  ANNA SI: negative  Greater troch: no tenderness   Internal/external hip ROM: intact, no pain   Flexion/extension knee ROM: intact, no pain   Vascular: WWP extremities, 2+DP bilateral      Diagnostic  "Tests   IMAGING: I have personally reviewed the images and these are my findings:  Lumbar Spine X-rays from 8/13/24: multi level lumbar spondylosis with loss of disc height, osteophyte formation and facet hypertrophy, L4-5 spondylolisthesis, no appreciated lytic/blastic lesions, no obvious instability    Cervical Spine X-Rays from 8/13/24: multi level cervical disc degeneration with disc desiccation, mild loss of disc height, anterior osteophytes, most noted at C4-5    Procedures, if performed today     None performed       Portions of the record may have been created with voice recognition software.  Occasional wrong word or \"sound a like\" substitutions may have occurred due to the inherent limitations of voice recognition software.  Read the chart carefully and recognize, using context, where substitutions have occurred.    "

## 2024-11-14 ENCOUNTER — APPOINTMENT (OUTPATIENT)
Dept: PHYSICAL THERAPY | Facility: CLINIC | Age: 59
End: 2024-11-14
Payer: COMMERCIAL

## 2024-11-18 ENCOUNTER — OFFICE VISIT (OUTPATIENT)
Dept: PHYSICAL THERAPY | Facility: CLINIC | Age: 59
End: 2024-11-18
Payer: COMMERCIAL

## 2024-11-18 DIAGNOSIS — M54.41 ACUTE BILATERAL LOW BACK PAIN WITH BILATERAL SCIATICA: ICD-10-CM

## 2024-11-18 DIAGNOSIS — M54.42 ACUTE BILATERAL LOW BACK PAIN WITH BILATERAL SCIATICA: ICD-10-CM

## 2024-11-18 DIAGNOSIS — M54.12 CERVICAL RADICULOPATHY: Primary | ICD-10-CM

## 2024-11-18 PROCEDURE — 97110 THERAPEUTIC EXERCISES: CPT | Performed by: PHYSICAL THERAPIST

## 2024-11-18 NOTE — PROGRESS NOTES
"Daily Note     Today's date: 2024  Patient name: Radha Jessica  : 1965  MRN: [id number]  Referring provider: Declan White MD  Dx:   Encounter Diagnosis     ICD-10-CM    1. Cervical radiculopathy  M54.12       2. Acute bilateral low back pain with bilateral sciatica  M54.42     M54.41                      Subjective: Patient states increased C/S and L/S pain of insidious onset; states no change with completion of repeated retraction.       Objective: See treatment diary below      Assessment: Patient demonstrated limited tolerance to activities this visit secondary to pain; declined manual intervention this visit.       Plan: Continue per plan of care.      Precautions: DM     Manuals 7/18 8/1 08/20  8/29  9/26  10/3  10/10  10/17  10/24  11/18   Gr II PA mobs L1-L4 BHAVESH def    BHAVESH                                       Supine nerve glides  BHAVESH NV                    man traction         BHAVESH  BHAVESH  BHAVESH  LQ  BHAVESH  decl.   C/spine snag                 BHAVESH  decl.   Neuro Re-Ed                       Slouch overcorrect 2x10 2x10 2x10   2x10  2x10  2x10  2x10  2x10        Retraction         2x10  2x10  supine x15  declined  2x10  supine 2x10    Retraction with ext         2x10  2x10  x10  x15                                                                                                       Ther Ex                       Rec bike nv 6' 6 min   4'  np    nv  5 min    5 min   LTR 5s x10 5\" x 10 5\"x20  5\" x20  5\" x20  5s x20  5s x20  5sx20    5sx20   Pelvic rocks     10x   2x10  2x10  np  2x10  2x10   Decl.   Seatd ball roll outs - 3 way 5 sec x10 5 sec x 10  5sec x10   5s x10  5s x10  10s x10  10s x10  10sx10   Decl.                                                                           Roopa ext - trial np                     Ther Activity                                                                       Gait Training                                                                       Modalities            "

## 2024-11-23 ENCOUNTER — HOSPITAL ENCOUNTER (OUTPATIENT)
Dept: MRI IMAGING | Facility: HOSPITAL | Age: 59
Discharge: HOME | End: 2024-11-23
Attending: ORTHOPAEDIC SURGERY
Payer: COMMERCIAL

## 2024-11-23 DIAGNOSIS — M54.12 CERVICAL RADICULOPATHY: ICD-10-CM

## 2024-11-23 DIAGNOSIS — M43.16 ANTEROLISTHESIS OF LUMBAR SPINE: ICD-10-CM

## 2024-11-23 PROCEDURE — 72141 MRI NECK SPINE W/O DYE: CPT

## 2024-11-25 ENCOUNTER — OFFICE VISIT (OUTPATIENT)
Dept: PHYSICAL THERAPY | Facility: CLINIC | Age: 59
End: 2024-11-25
Payer: COMMERCIAL

## 2024-11-25 DIAGNOSIS — M54.12 CERVICAL RADICULOPATHY: Primary | ICD-10-CM

## 2024-11-25 DIAGNOSIS — M54.42 ACUTE BILATERAL LOW BACK PAIN WITH BILATERAL SCIATICA: ICD-10-CM

## 2024-11-25 DIAGNOSIS — M54.41 ACUTE BILATERAL LOW BACK PAIN WITH BILATERAL SCIATICA: ICD-10-CM

## 2024-11-25 NOTE — PROGRESS NOTES
Placing PT on hold at this time as patient has not made definitive consistent progress.  To ortho next week for results of c/spine MRI.    
6

## 2024-12-02 ENCOUNTER — OFFICE VISIT (OUTPATIENT)
Dept: OBGYN CLINIC | Facility: HOSPITAL | Age: 59
End: 2024-12-02
Payer: COMMERCIAL

## 2024-12-02 VITALS — WEIGHT: 151.01 LBS | BODY MASS INDEX: 29.65 KG/M2 | HEIGHT: 60 IN

## 2024-12-02 DIAGNOSIS — M54.12 CERVICAL RADICULOPATHY: Primary | ICD-10-CM

## 2024-12-02 DIAGNOSIS — M47.812 CERVICAL SPONDYLOSIS: ICD-10-CM

## 2024-12-02 PROCEDURE — 99213 OFFICE O/P EST LOW 20 MIN: CPT | Performed by: ORTHOPAEDIC SURGERY

## 2024-12-02 NOTE — PROGRESS NOTES
Assessment & Plan/Medical Decision Makin y.o. female with Neck Pain and Bilateral Periscapular Pain and imaging findings most notable for Cervical Spondylosis        The clinical, physical and imaging findings were reviewed with the patient.  Radha  has a constellation of findings consistent with Cervical Radiculopathyin the setting of cervical degenerative disease.      Fortunately patient remains neurologically intact and functional.   We discussed the treatment options including physical therapy, at home exercises, activity modifications, chiropractic medicine, oral medications, interventional spine procedures.  At this time recommend continued conservative treatments.     Referral to chiropractic care for evaluation and treatment. Discussed potential role of chiropractic care in targeting current pain and symptoms.    Patient instructed to return to office/ER sooner if symptoms are not improving, getting worse, or new worrisome/neurologic symptoms arise.  Patient will follow up in approx 3 months for ongoing low back issues.       Subjective:      Chief Complaint: Neck Pain    HPI:   Radha Jessica is a 59 y.o. female presenting with chief complaint of neck pain and low back pain.  Pain began 1 month ago after sustaining a fall after walking on uneven surface. She states she fell onto her left side which aggravated her low back pain and neck pain.      Describes pain as sharp and burning in nature.  Located in low back.  Denies numbness of lower extremities. She reports numbness of left arm.  Reports burning.  Back pain 70%, Leg pain 30%.  Right> left. Pain is worse with prolonged standing and walking and improves with rest.     Denies any lucas trauma. Denies fever or chills, no night sweats. Denies any bladder or bowel changes.       Conservative therapy includes the following:   Medications: Oxycodone  Injections: Had epidural done in 2024 with Dr. Marcum [facility] pain center states it  provided no relief.     Physical Therapy: she has done PT for her low back  Chiropractic Medicine: has not attempted  Accupunture/Massage Therapy: has not attempted   These therapeutic modalities were ineffective at providing sustained pain relief/functional improvement.      Nicotine dependent: smokes occasionally   Occupation: admin worker  Living situation: Lives with family   ADLs: patient is able to perform      11/5/24 Update  Patient is here for follow up.   Has been doing physical therapy for back and neck.  Continues with pain.  Now has worsening headaches.  Recently had injection in October Dr. Marcum and will have next shot Nov 11.   Notes pain with walking in gluteal region. +shopping cart sign.   Has had about 6 lumbar injections including epidurals which only temporarily helped.      12/2/24 Update  Patient is here for follow up. She reports intermittent neck pain and headaches, especially with cold or bad weather. Physical therapy has been placed on hold due to no improvement. She was at extreme pain at her last visit. She has difficulty with cervical range of motion. Has had two cervical epidural steroid injections, last injection on 11/11/2024. No relief in symptoms since last injections. Bilateral radiating symptoms in arms.     Objective:     Family History   Family history unknown: Yes       Past Medical History:   Diagnosis Date    Anxiety     Arthritis     hands    Asthma     Degenerative lumbar disc     Depression     Diabetes (HCC) 07/08/2024       Current Outpatient Medications   Medication Sig Dispense Refill    albuterol (2.5 mg/3 mL) 0.083 % nebulizer solution Take 3 mL (2.5 mg total) by nebulization every 4 (four) hours as needed for wheezing or shortness of breath 150 mL 0    albuterol (2.5 mg/3 mL) 0.083 % nebulizer solution Take 3 mL (2.5 mg total) by nebulization every 6 (six) hours as needed for wheezing or shortness of breath 75 mL 0    albuterol (PROVENTIL HFA,VENTOLIN HFA) 90  mcg/act inhaler albuterol sulfate HFA 90 mcg/actuation aerosol inhaler      celecoxib (CeleBREX) 200 mg capsule TAKE 1 CAPSULE BY MOUTH TWICE A DAY WITH FOOD FOR 30 DAYS AS NEEDED      clotrimazole 1 % external solution clotrimazole 1 % topical solution      diclofenac sodium (VOLTAREN) 1 % Apply 2 g topically 4 (four) times a day as needed (Pain) 100 g 0    Diclofenac Sodium (VOLTAREN) 1 % Apply 2 g topically 4 (four) times a day 100 g 1    DULoxetine HCl 30 MG CSDR duloxetine 30 mg capsule,delayed release      fluticasone (FLOVENT HFA) 110 MCG/ACT inhaler Flovent  mcg/actuation aerosol inhaler      methocarbamol (ROBAXIN) 500 mg tablet Take 1 tablet (500 mg total) by mouth 2 (two) times a day as needed for muscle spasms 20 tablet 0    naproxen (NAPROSYN) 500 mg tablet Take 1 tablet (500 mg total) by mouth 2 (two) times a day with meals 60 tablet 0    oxyCODONE (ROXICODONE) 5 immediate release tablet TAKE 2 TABLETS BY MOUTH 3 TIMES A DAY AS NEEDED FOR PAIN      oxyCODONE-acetaminophen (PERCOCET) 5-325 mg per tablet oxycodone-acetaminophen 5 mg-325 mg tablet      pregabalin (LYRICA) 50 mg capsule Take 50 mg by mouth daily       No current facility-administered medications for this visit.       Past Surgical History:   Procedure Laterality Date     SECTION         Social History     Socioeconomic History    Marital status: Single     Spouse name: Not on file    Number of children: Not on file    Years of education: Not on file    Highest education level: Not on file   Occupational History    Not on file   Tobacco Use    Smoking status: Some Days     Current packs/day: 0.50     Average packs/day: 0.5 packs/day for 30.0 years (15.0 ttl pk-yrs)     Types: Cigarettes    Smokeless tobacco: Never   Vaping Use    Vaping status: Never Used   Substance and Sexual Activity    Alcohol use: Not Currently    Drug use: Never    Sexual activity: Not on file   Other Topics Concern    Not on file   Social History  Narrative    Not on file     Social Drivers of Health     Financial Resource Strain: Not on file   Food Insecurity: Not on file   Transportation Needs: Not on file   Physical Activity: Not on file   Stress: Not on file   Social Connections: Not on file   Intimate Partner Violence: Not on file   Housing Stability: Not on file       No Known Allergies    Review of Systems  General- denies fever/chills  HEENT- denies hearing loss or sore throat  Eyes- denies eye pain or visual disturbances, denies red eyes  Respiratory- denies cough or SOB  Cardio- denies chest pain or palpitations  GI- denies abdominal pain  Endocrine- denies urinary frequency  Urinary- denies pain with urination  Musculoskeletal- Negative except noted above  Skin- denies rashes or wounds  Neurological- denies dizziness or headache  Psychiatric- denies anxiety or difficulty concentrating    Physical Exam  Ht 5' (1.524 m)   Wt 68.5 kg (151 lb 0.2 oz)   BMI 29.49 kg/m²     General/Constitutional: No apparent distress: well-nourished and well developed.  Lymphatic: No appreciable lymphadenopathy  Respiratory: Non-labored breathing  Vascular: No edema, swelling or tenderness, except as noted in detailed exam.  Integumentary: No impressive skin lesions present, except as noted in detailed exam.  Psych: Normal mood and affect, oriented to person, place and time.  MSK: normal other than stated in HPI and exam  Gait & balance: no evidence of myelopathic gait, ambulates Independently     Cervical  spine range of motion:  -Forward flexion to 90  -Extension to neutral  -Lateral bend 25 right, 25 left  -Rotation 25 right, 25 left  There tenderness with palpation along lumbar paraspinal musculature, no midline tenderness      Neurologic:     Lower Extremity Motor Function     Right  Left    Iliopsoas  5/5  5/5    Quadriceps 5/5 5/5   Tibialis anterior  5/5  5/5    EHL  5/5 5/5    Gastroc. muscle  5/5  5/5    Heel rise  5/5  5/5    Toe rise  5/5 5/5      "  Sensory: light touch is intact to bilateral upper and lower extremities      Reflexes:     Right Left   Patellar 1+ 1+   Achilles 1+ 1+   Babinski neg neg      Other tests:  Straight Leg Raise: negative  Delonte SI: positive  ANNA SI: negative  Greater troch: no tenderness   Internal/external hip ROM: intact, no pain   Flexion/extension knee ROM: intact, no pain   Vascular: WWP extremities, 2+DP bilateral      Diagnostic Tests   IMAGING: I have personally reviewed the images and these are my findings:  Cervical Spine X-Rays from 8/13/24: multi level cervical disc degeneration with disc desiccation, mild loss of disc height, anterior osteophytes, most noted at C4-5     Cervical Spine MRI from 11/23/2024: multi level cervical disc degeneration with disc desiccation, loss of disc height, facet and ligamentum hypertrophy, varying degrees of mild foraminal stenosis, no significant central stenosis, no cord signal abnormalities      Procedures, if performed today     None performed       Portions of the record may have been created with voice recognition software.  Occasional wrong word or \"sound a like\" substitutions may have occurred due to the inherent limitations of voice recognition software.  Read the chart carefully and recognize, using context, where substitutions have occurred.    "

## 2024-12-05 ENCOUNTER — TELEPHONE (OUTPATIENT)
Dept: OBGYN CLINIC | Facility: HOSPITAL | Age: 59
End: 2024-12-05

## 2024-12-17 ENCOUNTER — OFFICE VISIT (OUTPATIENT)
Age: 59
End: 2024-12-17
Payer: COMMERCIAL

## 2024-12-17 VITALS
SYSTOLIC BLOOD PRESSURE: 140 MMHG | HEART RATE: 103 BPM | DIASTOLIC BLOOD PRESSURE: 82 MMHG | HEIGHT: 60 IN | WEIGHT: 151 LBS | BODY MASS INDEX: 29.64 KG/M2

## 2024-12-17 DIAGNOSIS — M47.812 CERVICAL SPONDYLOSIS: ICD-10-CM

## 2024-12-17 DIAGNOSIS — M99.01 SEGMENTAL DYSFUNCTION OF CERVICAL REGION: Primary | ICD-10-CM

## 2024-12-17 DIAGNOSIS — M62.838 MUSCLE SPASM: ICD-10-CM

## 2024-12-17 DIAGNOSIS — M54.12 CERVICAL RADICULOPATHY: ICD-10-CM

## 2024-12-17 DIAGNOSIS — M99.02 SEGMENTAL DYSFUNCTION OF THORACIC REGION: ICD-10-CM

## 2024-12-17 DIAGNOSIS — M99.03 SEGMENTAL DYSFUNCTION OF LUMBAR REGION: ICD-10-CM

## 2024-12-17 DIAGNOSIS — M47.816 LUMBAR SPONDYLOSIS: ICD-10-CM

## 2024-12-17 PROCEDURE — 98941 CHIROPRACT MANJ 3-4 REGIONS: CPT | Performed by: CHIROPRACTOR

## 2024-12-17 PROCEDURE — 99203 OFFICE O/P NEW LOW 30 MIN: CPT | Performed by: CHIROPRACTOR

## 2025-01-07 ENCOUNTER — PROCEDURE VISIT (OUTPATIENT)
Age: 60
End: 2025-01-07
Payer: COMMERCIAL

## 2025-01-07 VITALS — BODY MASS INDEX: 29.64 KG/M2 | HEIGHT: 60 IN | WEIGHT: 151 LBS

## 2025-01-07 DIAGNOSIS — M47.812 CERVICAL SPONDYLOSIS: ICD-10-CM

## 2025-01-07 DIAGNOSIS — M99.01 SEGMENTAL DYSFUNCTION OF CERVICAL REGION: ICD-10-CM

## 2025-01-07 DIAGNOSIS — M99.02 SEGMENTAL DYSFUNCTION OF THORACIC REGION: ICD-10-CM

## 2025-01-07 DIAGNOSIS — M47.816 LUMBAR SPONDYLOSIS: ICD-10-CM

## 2025-01-07 DIAGNOSIS — M62.838 MUSCLE SPASM: ICD-10-CM

## 2025-01-07 DIAGNOSIS — M99.03 SEGMENTAL DYSFUNCTION OF LUMBAR REGION: ICD-10-CM

## 2025-01-07 DIAGNOSIS — M54.12 CERVICAL RADICULOPATHY: Primary | ICD-10-CM

## 2025-01-07 PROCEDURE — 98941 CHIROPRACT MANJ 3-4 REGIONS: CPT | Performed by: CHIROPRACTOR

## 2025-01-07 NOTE — PROGRESS NOTES
Initial date of service: 12/17/24    Diagnoses and all orders for this visit:    Cervical radiculopathy    Cervical spondylosis    Segmental dysfunction of cervical region    Segmental dysfunction of thoracic region    Muscle spasm    Segmental dysfunction of lumbar region    Lumbar spondylosis    Pt responded to tx with reduced pain and increased ROM; discussed referral to Behavioral Health for depression over pain/injury but she says she already speaks with someone    TREATMENT: 04309  Ther-ex: IASTM - discussed post procedure soreness and/or ecchymosis for up to 36 hrs, applied to affected mm hypertonicities; wall floyd, axial retraction, upper trap stretch, lev scap stretch, SCM stretch, transitional mvmt education, abdominal bracing. Thoracic mobilization/manipulation: prone P-A mob, supine A-P manip; cervical mobilization/manipulation: traction, diversified supine graded mobilization    HPI:  Radha Jessica is a 59 y.o. female   Chief Complaint   Patient presents with    Neck Pain     Neck pain-10     Pt presents for tx for exacerbation of chronic back pain with new neck pain onset June 2024 with tripping and falling on uneven surface onto her left side; pt reports head trauma on left side but did not lose consciousness. Pt underwent PT and injections without modest benefit. Pt had surgical consult with Dr White who recommended conservative care vs surgical and referred here  1/7: pt reports she has good days and bad; back feeling better but getting depressed over the neck pain     Neck Pain   This is a new problem. The current episode started more than 1 year ago. The problem occurs constantly. The problem has been waxing and waning. The pain is present in the left side, midline and occipital region. The quality of the pain is described as aching, stabbing and burning. The symptoms are aggravated by bending, position, stress and twisting. The pain is Same all the time. Associated symptoms include  headaches. Pertinent negatives include no weakness.   Back Pain  This is a chronic problem. The current episode started more than 1 year ago. The problem occurs daily. The problem has been waxing and waning since onset. The pain is present in the lumbar spine and thoracic spine. The quality of the pain is described as aching. The pain does not radiate. The pain is The same all the time. The symptoms are aggravated by bending, standing and twisting (paliative includes going for a walk). Stiffness is present All day. Associated symptoms include headaches. Pertinent negatives include no bowel incontinence or weakness. Risk factors include lack of exercise, sedentary lifestyle and poor posture.     Past Medical History:   Diagnosis Date    Anxiety     Arthritis     hands    Asthma     Degenerative lumbar disc     Depression     Diabetes (HCC) 07/08/2024      The following portions of the patient's history were reviewed and updated as appropriate: allergies, past family history, past medical history, past social history, past surgical history, and problem list.  Review of Systems   Gastrointestinal:  Negative for bowel incontinence.   Musculoskeletal:  Positive for back pain and neck pain.   Neurological:  Positive for headaches. Negative for weakness.     Physical Exam  Neck:        Comments: Pnful and limited Lrot, Llf, Fl, Ext  Musculoskeletal:      Cervical back: Pain with movement and muscular tenderness present. Decreased range of motion.      Thoracic back: Spasms and tenderness present. Decreased range of motion.      Lumbar back: Spasms and tenderness present. Decreased range of motion. Negative right straight leg raise test and negative left straight leg raise test.        Back:       Comments: Pnful and limited Blf, Ext, [place]   Lymphadenopathy:      Cervical: No cervical adenopathy.   Neurological:      Mental Status: She is alert and oriented to person, place, and time.      Gait: Gait is intact.   Psychiatric:          Mood and Affect: Mood and affect normal.         Behavior: Behavior normal.     SOFT TISSUE ASSESSMENT: Hypertonicity and tenderness palpated B C5-T4, L4-S1 erector spinae, L upper traps, L lev scap, L SCM JOINT RECTRICTIONS: C5-T4, L4-S1     Return in about 1 week (around 1/14/2025) for Next scheduled follow up.

## 2025-02-08 ENCOUNTER — APPOINTMENT (EMERGENCY)
Dept: CT IMAGING | Facility: HOSPITAL | Age: 60
End: 2025-02-08
Payer: COMMERCIAL

## 2025-02-08 ENCOUNTER — APPOINTMENT (OUTPATIENT)
Dept: LAB | Facility: CLINIC | Age: 60
End: 2025-02-08
Payer: COMMERCIAL

## 2025-02-08 ENCOUNTER — HOSPITAL ENCOUNTER (EMERGENCY)
Facility: HOSPITAL | Age: 60
Discharge: HOME | End: 2025-02-08
Attending: EMERGENCY MEDICINE
Payer: COMMERCIAL

## 2025-02-08 VITALS
TEMPERATURE: 98.8 F | HEART RATE: 94 BPM | OXYGEN SATURATION: 99 % | SYSTOLIC BLOOD PRESSURE: 159 MMHG | RESPIRATION RATE: 18 BRPM | BODY MASS INDEX: 30.5 KG/M2 | DIASTOLIC BLOOD PRESSURE: 95 MMHG | HEIGHT: 59 IN

## 2025-02-08 DIAGNOSIS — M54.2 NECK PAIN: ICD-10-CM

## 2025-02-08 DIAGNOSIS — R91.8 LUNG MASS: ICD-10-CM

## 2025-02-08 DIAGNOSIS — E04.1 THYROID NODULE: Primary | ICD-10-CM

## 2025-02-08 DIAGNOSIS — E11.65 INADEQUATELY CONTROLLED DIABETES MELLITUS (HCC): ICD-10-CM

## 2025-02-08 LAB
ALBUMIN SERPL BCG-MCNC: 4.5 G/DL (ref 3.5–5)
ALP SERPL-CCNC: 147 U/L (ref 34–104)
ALT SERPL W P-5'-P-CCNC: 52 U/L (ref 7–52)
ANION GAP SERPL CALCULATED.3IONS-SCNC: 4 MMOL/L (ref 4–13)
ANION GAP SERPL CALCULATED.3IONS-SCNC: 7 MMOL/L (ref 4–13)
AST SERPL W P-5'-P-CCNC: 23 U/L (ref 13–39)
BASOPHILS # BLD AUTO: 0.02 THOUSANDS/ΜL (ref 0–0.1)
BASOPHILS NFR BLD AUTO: 0 % (ref 0–1)
BILIRUB SERPL-MCNC: 0.38 MG/DL (ref 0.2–1)
BUN SERPL-MCNC: 20 MG/DL (ref 5–25)
BUN SERPL-MCNC: 20 MG/DL (ref 5–25)
CALCIUM SERPL-MCNC: 8.9 MG/DL (ref 8.4–10.2)
CALCIUM SERPL-MCNC: 9.7 MG/DL (ref 8.4–10.2)
CHLORIDE SERPL-SCNC: 104 MMOL/L (ref 96–108)
CHLORIDE SERPL-SCNC: 104 MMOL/L (ref 96–108)
CO2 SERPL-SCNC: 26 MMOL/L (ref 21–32)
CO2 SERPL-SCNC: 28 MMOL/L (ref 21–32)
CREAT SERPL-MCNC: 0.54 MG/DL (ref 0.6–1.3)
CREAT SERPL-MCNC: 0.58 MG/DL (ref 0.6–1.3)
EOSINOPHIL # BLD AUTO: 0.14 THOUSAND/ΜL (ref 0–0.61)
EOSINOPHIL NFR BLD AUTO: 2 % (ref 0–6)
ERYTHROCYTE [DISTWIDTH] IN BLOOD BY AUTOMATED COUNT: 12.8 % (ref 11.6–15.1)
EST. AVERAGE GLUCOSE BLD GHB EST-MCNC: 295 MG/DL
GFR SERPL CREATININE-BSD FRML MDRD: 102 ML/MIN/1.73SQ M
GFR SERPL CREATININE-BSD FRML MDRD: 105 ML/MIN/1.73SQ M
GLUCOSE P FAST SERPL-MCNC: 156 MG/DL (ref 65–99)
GLUCOSE SERPL-MCNC: 159 MG/DL (ref 65–140)
HBA1C MFR BLD: 11.9 %
HCT VFR BLD AUTO: 38.3 % (ref 34.8–46.1)
HGB BLD-MCNC: 12.3 G/DL (ref 11.5–15.4)
IMM GRANULOCYTES # BLD AUTO: 0.03 THOUSAND/UL (ref 0–0.2)
IMM GRANULOCYTES NFR BLD AUTO: 1 % (ref 0–2)
LYMPHOCYTES # BLD AUTO: 2.82 THOUSANDS/ΜL (ref 0.6–4.47)
LYMPHOCYTES NFR BLD AUTO: 44 % (ref 14–44)
MCH RBC QN AUTO: 28 PG (ref 26.8–34.3)
MCHC RBC AUTO-ENTMCNC: 32.1 G/DL (ref 31.4–37.4)
MCV RBC AUTO: 87 FL (ref 82–98)
MONOCYTES # BLD AUTO: 0.43 THOUSAND/ΜL (ref 0.17–1.22)
MONOCYTES NFR BLD AUTO: 7 % (ref 4–12)
NEUTROPHILS # BLD AUTO: 3.03 THOUSANDS/ΜL (ref 1.85–7.62)
NEUTS SEG NFR BLD AUTO: 46 % (ref 43–75)
NRBC BLD AUTO-RTO: 0 /100 WBCS
PLATELET # BLD AUTO: 275 THOUSANDS/UL (ref 149–390)
PMV BLD AUTO: 9.3 FL (ref 8.9–12.7)
POTASSIUM SERPL-SCNC: 4 MMOL/L (ref 3.5–5.3)
POTASSIUM SERPL-SCNC: 4.5 MMOL/L (ref 3.5–5.3)
PROT SERPL-MCNC: 7.9 G/DL (ref 6.4–8.4)
RBC # BLD AUTO: 4.39 MILLION/UL (ref 3.81–5.12)
SODIUM SERPL-SCNC: 136 MMOL/L (ref 135–147)
SODIUM SERPL-SCNC: 137 MMOL/L (ref 135–147)
WBC # BLD AUTO: 6.47 THOUSAND/UL (ref 4.31–10.16)

## 2025-02-08 PROCEDURE — 99284 EMERGENCY DEPT VISIT MOD MDM: CPT | Performed by: EMERGENCY MEDICINE

## 2025-02-08 PROCEDURE — 70496 CT ANGIOGRAPHY HEAD: CPT

## 2025-02-08 PROCEDURE — 99284 EMERGENCY DEPT VISIT MOD MDM: CPT

## 2025-02-08 PROCEDURE — 70498 CT ANGIOGRAPHY NECK: CPT

## 2025-02-08 PROCEDURE — 83036 HEMOGLOBIN GLYCOSYLATED A1C: CPT

## 2025-02-08 PROCEDURE — 80053 COMPREHEN METABOLIC PANEL: CPT

## 2025-02-08 PROCEDURE — 96374 THER/PROPH/DIAG INJ IV PUSH: CPT

## 2025-02-08 PROCEDURE — 85025 COMPLETE CBC W/AUTO DIFF WBC: CPT | Performed by: EMERGENCY MEDICINE

## 2025-02-08 PROCEDURE — 36415 COLL VENOUS BLD VENIPUNCTURE: CPT

## 2025-02-08 PROCEDURE — 80048 BASIC METABOLIC PNL TOTAL CA: CPT | Performed by: EMERGENCY MEDICINE

## 2025-02-08 RX ORDER — ACETAMINOPHEN 10 MG/ML
1000 INJECTION, SOLUTION INTRAVENOUS ONCE
Status: COMPLETED | OUTPATIENT
Start: 2025-02-08 | End: 2025-02-08

## 2025-02-08 RX ORDER — LIDOCAINE 50 MG/G
1 PATCH TOPICAL ONCE
Status: DISCONTINUED | OUTPATIENT
Start: 2025-02-08 | End: 2025-02-08 | Stop reason: HOSPADM

## 2025-02-08 RX ADMIN — LIDOCAINE 1 PATCH: 50 PATCH CUTANEOUS at 11:33

## 2025-02-08 RX ADMIN — IOHEXOL 70 ML: 350 INJECTION, SOLUTION INTRAVENOUS at 11:24

## 2025-02-08 RX ADMIN — ACETAMINOPHEN 1000 MG: 10 INJECTION INTRAVENOUS at 11:33

## 2025-02-08 NOTE — DISCHARGE INSTRUCTIONS
Incidental discovery of heterogeneous thyromegaly with intermixed nodules whose borders are difficult to ascertain. Recommendation is for nonemergent outpatient follow-up thyroid ultrasound.     Incidentally discovered irregularly marginated 1.1 cm apical right lung mass in a patient with emphysematous changes. Recommendation is for further work-up with PET/CT.

## 2025-02-08 NOTE — INCIDENTAL FINDINGS
The following findings require follow up:  Radiographic finding   Finding:Incidental discovery of heterogeneous thyromegaly with intermixed nodules whose borders are difficult to ascertain. Recommendation is for nonemergent outpatient follow-up thyroid ultrasound.     Incidentally discovered irregularly marginated 1.1 cm apical right lung mass in a patient with emphysematous changes. Recommendation is for further work-up with PET/CT.   Follow up required: yes   Follow up should be done within 1 month(s)    Please notify the following clinician to assist with the follow up:   Dr. Morse    Incidental finding results were discussed with the Patient by Albert Walker MD on 02/08/25.   They expressed understanding and all questions answered.

## 2025-02-08 NOTE — ED PROVIDER NOTES
"  ED Disposition       None          Assessment & Plan       Medical Decision Making  Patient is a 59-year-old female with past medical history of cervical radiculopathy, cervical spondylosis, who presents with 2-day history of worsening neck pain associated with abrupt twisting motion of the neck and decreased range of motion.  Patient with history of cervical spondylosis.  Denying any radiating arm tingling or numbness or loss of sensation. CTA of the head and neck was performed to rule out possible vertebral artery dissection and was negative for any acute intracranial pathology or vertebral artery dissection.  Incidental findings included nonspecific heterogenous thyroid megaly with nodules and a right upper lobe 1 cm nodule which are to be followed with outpatient ultrasound of the thyroid and CT/PET scan. Differential diagnosis of neck pain includes includes cervical muscle strain, osteoarthritis, herniated disc, vertebral artery dissection, compression fracture, deep neck infection, thyroiditis, shoulder disorder.      Amount and/or Complexity of Data Reviewed  Labs: ordered.  Radiology: ordered.    Risk  Prescription drug management.             Medications   acetaminophen (Ofirmev) injection 1,000 mg (has no administration in time range)   lidocaine (LIDODERM) 5 % patch 1 patch (has no administration in time range)       ED Risk Strat Scores                                              History of Present Illness       Chief Complaint   Patient presents with    Neck Pain     Pt reports fall last year w/ \"disc problem\"; Neck pain since July of last year; worsening the last few day until 10/10 neck pain radiating to lower spine; denies CP/SOB       Past Medical History:   Diagnosis Date    Anxiety     Arthritis     hands    Asthma     Degenerative lumbar disc     Depression     Diabetes (HCC) 2024      Past Surgical History:   Procedure Laterality Date     SECTION        Family History   Family " history unknown: Yes      Social History     Tobacco Use    Smoking status: Some Days     Current packs/day: 0.50     Average packs/day: 0.5 packs/day for 30.0 years (15.0 ttl pk-yrs)     Types: Cigarettes    Smokeless tobacco: Never   Vaping Use    Vaping status: Never Used   Substance Use Topics    Alcohol use: Not Currently    Drug use: Never      E-Cigarette/Vaping    E-Cigarette Use Never User       E-Cigarette/Vaping Substances    Nicotine No     THC No     CBD No     Flavoring No     Other No     Unknown No       I have reviewed and agree with the history as documented.     Patient is a 59-year-old female with past medical history of cervical radiculopathy, cervical spondylosis, L4-L5 degenerative spondylolisthesis, diabetes who presents with acute on chronic worsening of neck pain.  Patient has a history of a fall in June 2020 for which she fell on the left side of her body and resulted in neck pain since.  Patient presents today due to worsening of left-sided neck pain that began 2 days ago.  She states that her neck pain is currently a 10 out of 10,  with some radiation toward the right side.  It is associated with decreased range of motion.  Patient states aggravation of pain with positional movements, and relief of pain with Tylenol.  She denies any trauma but does endorse an episode of an abrupt twisting movement of her neck to the right side 2 days ago.  The neck pain is also associated with a frontal headache, she is denying any focal weakness or loss of sensation.  She does endorse intermittent right hand tingling that has been ongoing for several months and seems to follow a ulnar nerve distribution.  She does also have a history of carpal tunnel syndrom in the right hand.  She does endorse blurry vision on a chronic basis, states may be related to her history of diabetes.  She also endorses back pain and intermittent right leg weakness on for several months.  She denies any saddle anesthesia,  urinary incontinence, does endorse fecal incontinence a few days ago and states it was associated with diarrhea and abdominal pain.  She has a history of L4-L5 degenerative spondylolisthesis and follows up with orthopedic surgery outpatient.  She denies any fevers, chills, sore throat, rhinorrhea, chest pain, shortness of breath, abdominal pain, nausea or vomiting.       Neck Pain  Associated symptoms: weakness    Associated symptoms: no chest pain, no fever and no numbness        Review of Systems   Constitutional:  Negative for chills and fever.   HENT:  Negative for sore throat.    Eyes:  Negative for pain and visual disturbance.   Respiratory:  Negative for cough and shortness of breath.    Cardiovascular:  Negative for chest pain, palpitations and leg swelling.   Gastrointestinal:  Negative for abdominal pain, constipation, diarrhea, nausea and vomiting.   Genitourinary:  Negative for dysuria and hematuria.   Musculoskeletal:  Positive for back pain, neck pain and neck stiffness. Negative for arthralgias.   Skin:  Negative for color change and rash.   Neurological:  Positive for weakness. Negative for seizures, syncope and numbness.   All other systems reviewed and are negative.          Objective       ED Triage Vitals [02/08/25 1008]   Temperature Pulse Blood Pressure Respirations SpO2 Patient Position - Orthostatic VS   98.8 °F (37.1 °C) 95 147/84 18 99 % Sitting      Temp src Heart Rate Source BP Location FiO2 (%) Pain Score    -- Monitor Right arm -- 10 - Worst Possible Pain      Vitals      Date and Time Temp Pulse SpO2 Resp BP Pain Score FACES Pain Rating User   02/08/25 1030 -- 94 99 % 18 159/95 -- -- ML   02/08/25 1008 98.8 °F (37.1 °C) 95 99 % 18 147/84 10 - Worst Possible Pain -- ML            Physical Exam  Vitals and nursing note reviewed.   Constitutional:       General: She is not in acute distress.     Appearance: She is well-developed.   HENT:      Head: Normocephalic and atraumatic.   Eyes:       Conjunctiva/sclera: Conjunctivae normal.   Neck:      Comments: Tenderness to left-sided paraspinal muscles and trapezius  Decreased range of motion with neck rotation  No spinal neck tenderness or palpable step-off  No cervical lymphadenopathy or thyromegaly  No carotid bruits on auscultation  Cardiovascular:      Rate and Rhythm: Normal rate and regular rhythm.      Heart sounds: No murmur heard.  Pulmonary:      Effort: Pulmonary effort is normal. No respiratory distress.      Breath sounds: Normal breath sounds.   Abdominal:      Palpations: Abdomen is soft.      Tenderness: There is no abdominal tenderness.   Musculoskeletal:         General: No swelling.      Cervical back: Neck supple. Tenderness present.   Skin:     General: Skin is warm and dry.      Capillary Refill: Capillary refill takes less than 2 seconds.   Neurological:      Mental Status: She is alert and oriented to person, place, and time.      Sensory: No sensory deficit.   Psychiatric:         Mood and Affect: Mood normal.         Results Reviewed       Procedure Component Value Units Date/Time    CBC and differential [[id number]]     Lab Status: No result Specimen: Blood     Basic metabolic panel [[id number]]     Lab Status: No result Specimen: Blood             CTA head and neck with and without contrast    (Results Pending)       Procedures    ED Medication and Procedure Management   Prior to Admission Medications   Prescriptions Last Dose Informant Patient Reported? Taking?   DULoxetine HCl 30 MG CSDR   Yes No   Sig: duloxetine 30 mg capsule,delayed release   Patient not taking: Reported on 1/7/2025   Diclofenac Sodium (VOLTAREN) 1 %   No No   Sig: Apply 2 g topically 4 (four) times a day   albuterol (2.5 mg/3 mL) 0.083 % nebulizer solution   No No   Sig: Take 3 mL (2.5 mg total) by nebulization every 4 (four) hours as needed for wheezing or shortness of breath   albuterol (2.5 mg/3 mL) 0.083 % nebulizer solution   No No   Sig: Take 3 mL  (2.5 mg total) by nebulization every 6 (six) hours as needed for wheezing or shortness of breath   albuterol (PROVENTIL HFA,VENTOLIN HFA) 90 mcg/act inhaler   Yes No   Sig: albuterol sulfate HFA 90 mcg/actuation aerosol inhaler   celecoxib (CeleBREX) 200 mg capsule   Yes No   Sig: TAKE 1 CAPSULE BY MOUTH TWICE A DAY WITH FOOD FOR 30 DAYS AS NEEDED   Patient not taking: Reported on 1/7/2025   clotrimazole 1 % external solution   Yes No   Sig: clotrimazole 1 % topical solution   diclofenac sodium (VOLTAREN) 1 %   No No   Sig: Apply 2 g topically 4 (four) times a day as needed (Pain)   fluticasone (FLOVENT HFA) 110 MCG/ACT inhaler   Yes No   Sig: Flovent  mcg/actuation aerosol inhaler   methocarbamol (ROBAXIN) 500 mg tablet   No No   Sig: Take 1 tablet (500 mg total) by mouth 2 (two) times a day as needed for muscle spasms   Patient not taking: Reported on 1/7/2025   naproxen (NAPROSYN) 500 mg tablet   No No   Sig: Take 1 tablet (500 mg total) by mouth 2 (two) times a day with meals   oxyCODONE (ROXICODONE) 5 immediate release tablet   Yes No   Sig: TAKE 2 TABLETS BY MOUTH 3 TIMES A DAY AS NEEDED FOR PAIN   oxyCODONE-acetaminophen (PERCOCET) 5-325 mg per tablet   Yes No   Sig: oxycodone-acetaminophen 5 mg-325 mg tablet   pregabalin (LYRICA) 50 mg capsule   Yes No   Sig: Take 50 mg by mouth daily   Patient not taking: Reported on 1/7/2025      Facility-Administered Medications: None     Patient's Medications   Discharge Prescriptions    No medications on file     No discharge procedures on file.  ED SEPSIS DOCUMENTATION            Albert Walker MD  02/08/25 2056

## 2025-02-11 ENCOUNTER — PROCEDURE VISIT (OUTPATIENT)
Age: 60
End: 2025-02-11
Payer: COMMERCIAL

## 2025-02-11 VITALS — BODY MASS INDEX: 29.64 KG/M2 | WEIGHT: 151 LBS | HEIGHT: 60 IN

## 2025-02-11 DIAGNOSIS — M62.838 MUSCLE SPASM: ICD-10-CM

## 2025-02-11 DIAGNOSIS — M47.816 LUMBAR SPONDYLOSIS: ICD-10-CM

## 2025-02-11 DIAGNOSIS — M99.01 SEGMENTAL DYSFUNCTION OF CERVICAL REGION: ICD-10-CM

## 2025-02-11 DIAGNOSIS — M47.812 CERVICAL SPONDYLOSIS: ICD-10-CM

## 2025-02-11 DIAGNOSIS — M99.03 SEGMENTAL DYSFUNCTION OF LUMBAR REGION: ICD-10-CM

## 2025-02-11 DIAGNOSIS — M99.02 SEGMENTAL DYSFUNCTION OF THORACIC REGION: ICD-10-CM

## 2025-02-11 DIAGNOSIS — M54.12 CERVICAL RADICULOPATHY: Primary | ICD-10-CM

## 2025-02-11 PROCEDURE — 98941 CHIROPRACT MANJ 3-4 REGIONS: CPT | Performed by: CHIROPRACTOR

## 2025-02-11 NOTE — PROGRESS NOTES
"Initial date of service: 12/17/24    Diagnoses and all orders for this visit:    Cervical radiculopathy    Cervical spondylosis    Segmental dysfunction of cervical region    Segmental dysfunction of thoracic region    Muscle spasm    Segmental dysfunction of lumbar region    Lumbar spondylosis    Mechanical neck/back pain in the setting of DJD, exacerbated by central sensitization and deconditioning    TREATMENT: 98697  Ther-ex: IASTM - discussed post procedure soreness and/or ecchymosis for up to 36 hrs, applied to affected mm hypertonicities; wall floyd, axial retraction, upper trap stretch, lev scap stretch, SCM stretch, transitional mvmt education, abdominal bracing. Thoracic mobilization/manipulation: prone P-A mob, supine A-P manip; cervical mobilization/manipulation: traction, diversified supine graded mobilization    HPI:  Radha Jessica is a 59 y.o. female   Chief Complaint   Patient presents with    Neck Pain     Neck pain-3     Pt presents for tx for exacerbation of chronic back pain with new neck pain onset June 2024 with tripping and falling on uneven surface onto her left side; pt reports head trauma on left side but did not lose consciousness. Pt underwent PT and injections without modest benefit. Pt had surgical consult with Dr White who recommended conservative care vs surgical and referred here  2/11: pt reports feeling much better since recent trip to ED for neck strain; \"maintaining a hopeful outlook\"    Neck Pain   This is a new problem. The current episode started more than 1 year ago. The problem occurs constantly. The problem has been waxing and waning. The pain is present in the left side, midline and occipital region. The quality of the pain is described as aching, stabbing and burning. The symptoms are aggravated by bending, position, stress and twisting. The pain is Same all the time. Associated symptoms include headaches. Pertinent negatives include no weakness.   Back Pain  This " is a chronic problem. The current episode started more than 1 year ago. The problem occurs daily. The problem has been waxing and waning since onset. The pain is present in the lumbar spine and thoracic spine. The quality of the pain is described as aching. The pain does not radiate. The pain is The same all the time. The symptoms are aggravated by bending, standing and twisting (paliative includes going for a walk). Stiffness is present All day. Associated symptoms include headaches. Pertinent negatives include no bowel incontinence or weakness. Risk factors include lack of exercise, sedentary lifestyle and poor posture.     Past Medical History:   Diagnosis Date    Anxiety     Arthritis     hands    Asthma     Degenerative lumbar disc     Depression     Diabetes (HCC) 07/08/2024      The following portions of the patient's history were reviewed and updated as appropriate: allergies, past family history, past medical history, past social history, past surgical history, and problem list.  Review of Systems   Gastrointestinal:  Negative for bowel incontinence.   Musculoskeletal:  Positive for back pain and neck pain.   Neurological:  Positive for headaches. Negative for weakness.     Physical Exam  Neck:        Comments: Pnful and limited Lrot, Llf, Fl, Ext  Musculoskeletal:      Cervical back: Pain with movement and muscular tenderness present. Decreased range of motion.      Thoracic back: Spasms and tenderness present. Decreased range of motion.      Lumbar back: Spasms and tenderness present. Decreased range of motion. Negative right straight leg raise test and negative left straight leg raise test.        Back:       Comments: Pnful and limited Blf, Ext, [place]   Lymphadenopathy:      Cervical: No cervical adenopathy.   Neurological:      Mental Status: She is alert and oriented to person, place, and time.      Gait: Gait is intact.   Psychiatric:         Mood and Affect: Mood and affect normal.         Behavior:  Behavior normal.     SOFT TISSUE ASSESSMENT: Hypertonicity and tenderness palpated B C5-T4, L4-S1 erector spinae, L upper traps, L lev scap, L SCM JOINT RECTRICTIONS: C5-T4, L4-S1     Return in about 2 weeks (around 2/25/2025) for Next scheduled follow up.

## 2025-03-04 ENCOUNTER — PROCEDURE VISIT (OUTPATIENT)
Age: 60
End: 2025-03-04
Payer: COMMERCIAL

## 2025-03-04 VITALS — WEIGHT: 151 LBS | HEIGHT: 60 IN | BODY MASS INDEX: 29.64 KG/M2

## 2025-03-04 DIAGNOSIS — M99.02 SEGMENTAL DYSFUNCTION OF THORACIC REGION: ICD-10-CM

## 2025-03-04 DIAGNOSIS — M99.01 SEGMENTAL DYSFUNCTION OF CERVICAL REGION: ICD-10-CM

## 2025-03-04 DIAGNOSIS — M99.03 SEGMENTAL DYSFUNCTION OF LUMBAR REGION: ICD-10-CM

## 2025-03-04 DIAGNOSIS — M47.816 LUMBAR SPONDYLOSIS: ICD-10-CM

## 2025-03-04 DIAGNOSIS — M54.12 CERVICAL RADICULOPATHY: Primary | ICD-10-CM

## 2025-03-04 DIAGNOSIS — M62.838 MUSCLE SPASM: ICD-10-CM

## 2025-03-04 DIAGNOSIS — M47.812 CERVICAL SPONDYLOSIS: ICD-10-CM

## 2025-03-04 PROCEDURE — 98941 CHIROPRACT MANJ 3-4 REGIONS: CPT | Performed by: CHIROPRACTOR

## 2025-03-04 NOTE — PROGRESS NOTES
Initial date of service: 12/17/24    Diagnoses and all orders for this visit:    Cervical radiculopathy    Cervical spondylosis    Segmental dysfunction of cervical region    Segmental dysfunction of thoracic region    Muscle spasm    Segmental dysfunction of lumbar region    Lumbar spondylosis    Mechanical neck/back pain in the setting of DJD, exacerbated by central sensitization and deconditioning    TREATMENT: 62661  Ther-ex: IASTM - discussed post procedure soreness and/or ecchymosis for up to 36 hrs, applied to affected mm hypertonicities; wall floyd, axial retraction, upper trap stretch, lev scap stretch, SCM stretch, transitional mvmt education, abdominal bracing. Thoracic mobilization/manipulation: prone P-A mob, supine A-P manip; cervical mobilization/manipulation: traction, diversified supine graded mobilization    HPI:  Radha Jessica is a 59 y.o. female   Chief Complaint   Patient presents with    Neck Pain     Neck pain-5     Pt presents for tx for exacerbation of chronic back pain with new neck pain onset June 2024 with tripping and falling on uneven surface onto her left side; pt reports head trauma on left side but did not lose consciousness. Pt underwent PT and injections without modest benefit. Pt had surgical consult with Dr White who recommended conservative care vs surgical and referred here  3/4: pt reports was feeling better but suffered flare-up of neck pain and headache    Neck Pain   This is a new problem. The current episode started more than 1 year ago. The problem occurs constantly. The problem has been waxing and waning. The pain is present in the left side, midline and occipital region. The quality of the pain is described as aching, stabbing and burning. The symptoms are aggravated by bending, position, stress and twisting. The pain is Same all the time. Associated symptoms include headaches. Pertinent negatives include no weakness.   Back Pain  This is a chronic problem. The  current episode started more than 1 year ago. The problem occurs daily. The problem has been waxing and waning since onset. The pain is present in the lumbar spine and thoracic spine. The quality of the pain is described as aching. The pain does not radiate. The pain is The same all the time. The symptoms are aggravated by bending, standing and twisting (paliative includes going for a walk). Stiffness is present All day. Associated symptoms include headaches. Pertinent negatives include no bowel incontinence or weakness. Risk factors include lack of exercise, sedentary lifestyle and poor posture.     Past Medical History:   Diagnosis Date    Anxiety     Arthritis     hands    Asthma     Degenerative lumbar disc     Depression     Diabetes (HCC) 07/08/2024      The following portions of the patient's history were reviewed and updated as appropriate: allergies, past family history, past medical history, past social history, past surgical history, and problem list.  Review of Systems   Gastrointestinal:  Negative for bowel incontinence.   Musculoskeletal:  Positive for back pain and neck pain.   Neurological:  Positive for headaches. Negative for weakness.     Physical Exam  Neck:        Comments: Pnful and limited Lrot, Llf, Fl, Ext  Musculoskeletal:      Cervical back: Pain with movement and muscular tenderness present. Decreased range of motion.      Thoracic back: Spasms and tenderness present. Decreased range of motion.      Lumbar back: Spasms and tenderness present. Decreased range of motion. Negative right straight leg raise test and negative left straight leg raise test.        Back:       Comments: Pnful and limited Blf, Ext, [place]   Lymphadenopathy:      Cervical: No cervical adenopathy.   Neurological:      Mental Status: She is alert and oriented to person, place, and time.      Gait: Gait is intact.   Psychiatric:         Mood and Affect: Mood and affect normal.         Behavior: Behavior normal.     SOFT  TISSUE ASSESSMENT: Hypertonicity and tenderness palpated B C5-T4, L4-S1 erector spinae, L upper traps, L lev scap, L SCM JOINT RECTRICTIONS: C5-T4, L4-S1     Return in about 2 weeks (around 3/18/2025) for Next scheduled follow up.

## 2025-03-18 ENCOUNTER — PROCEDURE VISIT (OUTPATIENT)
Age: 60
End: 2025-03-18
Payer: COMMERCIAL

## 2025-03-18 VITALS — HEIGHT: 60 IN | BODY MASS INDEX: 29.64 KG/M2 | WEIGHT: 151 LBS

## 2025-03-18 DIAGNOSIS — M47.812 CERVICAL SPONDYLOSIS: ICD-10-CM

## 2025-03-18 DIAGNOSIS — M99.03 SEGMENTAL DYSFUNCTION OF LUMBAR REGION: ICD-10-CM

## 2025-03-18 DIAGNOSIS — M99.01 SEGMENTAL DYSFUNCTION OF CERVICAL REGION: ICD-10-CM

## 2025-03-18 DIAGNOSIS — M54.12 CERVICAL RADICULOPATHY: Primary | ICD-10-CM

## 2025-03-18 DIAGNOSIS — M47.816 LUMBAR SPONDYLOSIS: ICD-10-CM

## 2025-03-18 DIAGNOSIS — M99.02 SEGMENTAL DYSFUNCTION OF THORACIC REGION: ICD-10-CM

## 2025-03-18 DIAGNOSIS — M62.838 MUSCLE SPASM: ICD-10-CM

## 2025-03-18 PROCEDURE — 98941 CHIROPRACT MANJ 3-4 REGIONS: CPT | Performed by: CHIROPRACTOR

## 2025-03-18 NOTE — PROGRESS NOTES
Initial date of service: 12/17/24    Diagnoses and all orders for this visit:    Cervical radiculopathy    Cervical spondylosis    Segmental dysfunction of cervical region    Segmental dysfunction of thoracic region    Muscle spasm    Segmental dysfunction of lumbar region    Lumbar spondylosis    Mechanical neck/back pain in the setting of DJD, exacerbated by central sensitization and deconditioning    TREATMENT: 70168  Ther-ex: IASTM - discussed post procedure soreness and/or ecchymosis for up to 36 hrs, applied to affected mm hypertonicities; wall floyd, axial retraction, upper trap stretch, lev scap stretch, SCM stretch, transitional mvmt education, abdominal bracing. Thoracic mobilization/manipulation: prone P-A mob, supine A-P manip; cervical mobilization/manipulation: traction, diversified supine graded mobilization    HPI:  Radha Jessica is a 60 y.o. female   Chief Complaint   Patient presents with    Neck Pain     Neck pain-7     Pt presents for tx for exacerbation of chronic back pain with new neck pain onset June 2024 with tripping and falling on uneven surface onto her left side; pt reports head trauma on left side but did not lose consciousness. Pt underwent PT and injections without modest benefit. Pt had surgical consult with Dr White who recommended conservative care vs surgical and referred here  3/18: pt reports was feeling better but suffered flare-up of neck pain and headache    Neck Pain   This is a new problem. The current episode started more than 1 year ago. The problem occurs constantly. The problem has been waxing and waning. The pain is present in the left side, midline and occipital region. The quality of the pain is described as aching, stabbing and burning. The symptoms are aggravated by bending, position, stress and twisting. The pain is Same all the time. Associated symptoms include headaches. Pertinent negatives include no weakness.   Back Pain  This is a chronic problem. The  current episode started more than 1 year ago. The problem occurs daily. The problem has been waxing and waning since onset. The pain is present in the lumbar spine and thoracic spine. The quality of the pain is described as aching. The pain does not radiate. The pain is The same all the time. The symptoms are aggravated by bending, standing and twisting (paliative includes going for a walk). Stiffness is present All day. Associated symptoms include headaches. Pertinent negatives include no bowel incontinence or weakness. Risk factors include lack of exercise, sedentary lifestyle and poor posture.     Past Medical History:   Diagnosis Date    Anxiety     Arthritis     hands    Asthma     Degenerative lumbar disc     Depression     Diabetes (HCC) 07/08/2024      The following portions of the patient's history were reviewed and updated as appropriate: allergies, past family history, past medical history, past social history, past surgical history, and problem list.  Review of Systems   Gastrointestinal:  Negative for bowel incontinence.   Musculoskeletal:  Positive for back pain and neck pain.   Neurological:  Positive for headaches. Negative for weakness.     Physical Exam  Neck:        Comments: Pnful and limited Lrot, Llf, Fl, Ext  Musculoskeletal:      Cervical back: Pain with movement and muscular tenderness present. Decreased range of motion.      Thoracic back: Spasms and tenderness present. Decreased range of motion.      Lumbar back: Spasms and tenderness present. Decreased range of motion. Negative right straight leg raise test and negative left straight leg raise test.        Back:       Comments: Pnful and limited Blf, Ext, [place]   Lymphadenopathy:      Cervical: No cervical adenopathy.   Neurological:      Mental Status: She is alert and oriented to person, place, and time.      Gait: Gait is intact.   Psychiatric:         Mood and Affect: Mood and affect normal.         Behavior: Behavior normal.     SOFT  TISSUE ASSESSMENT: Hypertonicity and tenderness palpated B C5-T4, L4-S1 erector spinae, L upper traps, L lev scap, L SCM JOINT RECTRICTIONS: C5-T4, L4-S1     Return in about 1 week (around 3/25/2025) for Next scheduled follow up.

## 2025-03-31 ENCOUNTER — OFFICE VISIT (OUTPATIENT)
Dept: OBGYN CLINIC | Facility: HOSPITAL | Age: 60
End: 2025-03-31
Payer: COMMERCIAL

## 2025-03-31 VITALS — HEIGHT: 60 IN | WEIGHT: 151.01 LBS | BODY MASS INDEX: 29.65 KG/M2

## 2025-03-31 DIAGNOSIS — M54.12 CERVICAL RADICULOPATHY: Primary | ICD-10-CM

## 2025-03-31 DIAGNOSIS — M47.812 CERVICAL SPONDYLOSIS: ICD-10-CM

## 2025-03-31 PROCEDURE — 99212 OFFICE O/P EST SF 10 MIN: CPT | Performed by: ORTHOPAEDIC SURGERY

## 2025-03-31 NOTE — PROGRESS NOTES
Assessment & Plan/Medical Decision Makin y.o. female with Neck Pain and Bilateral Periscapular Pain and imaging findings most notable for Cervical Spondylosis        The clinical, physical and imaging findings were reviewed with the patient.  Radha  has a constellation of findings consistent with Cervical Radiculopathyin the setting of cervical degenerative disease.      Fortunately patient remains neurologically intact and functional.   We discussed the treatment options including physical therapy, at home exercises, activity modifications, chiropractic medicine, oral medications, interventional spine procedures.  At this time recommend continued conservative treatments.     Discussed at length with the patient that there is no surgical intervention recommended at this time. I recommend that she continues treatment with Spine and pain and consider Epidural injection and if no relief from that then a possible spinal cord stimulator.   Continue with PT/HEP and chiropractic medicine.     Patient instructed to return to office/ER sooner if symptoms are not improving, getting worse, or new worrisome/neurologic symptoms arise.  Patient will follow up in approx 3 months for ongoing low back issues.       Subjective:      Chief Complaint: Neck Pain    HPI:   Radha Jessica is a 59 y.o. female presenting with chief complaint of neck pain and low back pain.  Pain began 1 month ago after sustaining a fall after walking on uneven surface. She states she fell onto her left side which aggravated her low back pain and neck pain.      Describes pain as sharp and burning in nature.  Located in low back.  Denies numbness of lower extremities. She reports numbness of left arm.  Reports burning.  Back pain 70%, Leg pain 30%.  Right> left. Pain is worse with prolonged standing and walking and improves with rest.     Denies any lucas trauma. Denies fever or chills, no night sweats. Denies any bladder or bowel changes.        Conservative therapy includes the following:   Medications: Oxycodone  Injections: Had epidural done in July 2024 with Dr. Marcum [facility] pain center states it provided no relief.     Physical Therapy: she has done PT for her low back  Chiropractic Medicine: has not attempted  Accupunture/Massage Therapy: has not attempted   These therapeutic modalities were ineffective at providing sustained pain relief/functional improvement.      Nicotine dependent: smokes occasionally   Occupation: admin worker  Living situation: Lives with family   ADLs: patient is able to perform      11/5/24 Update  Patient is here for follow up.   Has been doing physical therapy for back and neck.  Continues with pain.  Now has worsening headaches.  Recently had injection in October Dr. Marcum and will have next shot Nov 11.   Notes pain with walking in gluteal region. +shopping cart sign.   Has had about 6 lumbar injections including epidurals which only temporarily helped.      12/2/24 Update  Patient is here for follow up. She reports intermittent neck pain and headaches, especially with cold or bad weather. Physical therapy has been placed on hold due to no improvement. She was at extreme pain at her last visit. She has difficulty with cervical range of motion. Has had two cervical epidural steroid injections, last injection on 11/11/2024. No relief in symptoms since last injections. Bilateral radiating symptoms in arms.     3/31/25:  Patient presents to the office today regarding her cervical spine.  Patient reports that she has intermittent pain about the cervical spine but lately it has been more constant and intermittent.  Patient reports that the pain is described as sharp and stabbing as well as dull and achy and will radiate around to the front of the neck.  Patient has any pain that radiates down her arms.  Patient Nuys any numbness tingling at this time.  Patient has had her spinal nerves burned on the left side with her  spine and pain provider that is outside of [facility].  Patient denies any change to her pain at all.  Patient reports that she is frustrated today and believes that she is not getting improvement with physical therapy, chiropractic medicine, or medications.  Patient with like to refrain from pain medication such as narcotics and over-the-counter due to fear of their side effects.  Patient would like to treat her pain opposed to mask it.    Objective:     Family History   Family history unknown: Yes       Past Medical History:   Diagnosis Date    Anxiety     Arthritis     hands    Asthma     Degenerative lumbar disc     Depression     Diabetes (HCC) 07/08/2024       Current Outpatient Medications   Medication Sig Dispense Refill    albuterol (2.5 mg/3 mL) 0.083 % nebulizer solution Take 3 mL (2.5 mg total) by nebulization every 4 (four) hours as needed for wheezing or shortness of breath 150 mL 0    albuterol (2.5 mg/3 mL) 0.083 % nebulizer solution Take 3 mL (2.5 mg total) by nebulization every 6 (six) hours as needed for wheezing or shortness of breath 75 mL 0    albuterol (PROVENTIL HFA,VENTOLIN HFA) 90 mcg/act inhaler albuterol sulfate HFA 90 mcg/actuation aerosol inhaler      clotrimazole 1 % external solution clotrimazole 1 % topical solution      diclofenac sodium (VOLTAREN) 1 % Apply 2 g topically 4 (four) times a day as needed (Pain) 100 g 0    Diclofenac Sodium (VOLTAREN) 1 % Apply 2 g topically 4 (four) times a day 100 g 1    fluticasone (FLOVENT HFA) 110 MCG/ACT inhaler Flovent  mcg/actuation aerosol inhaler      naproxen (NAPROSYN) 500 mg tablet Take 1 tablet (500 mg total) by mouth 2 (two) times a day with meals 60 tablet 0    oxyCODONE (ROXICODONE) 5 immediate release tablet TAKE 2 TABLETS BY MOUTH 3 TIMES A DAY AS NEEDED FOR PAIN      oxyCODONE-acetaminophen (PERCOCET) 5-325 mg per tablet oxycodone-acetaminophen 5 mg-325 mg tablet      celecoxib (CeleBREX) 200 mg capsule TAKE 1 CAPSULE BY  MOUTH TWICE A DAY WITH FOOD FOR 30 DAYS AS NEEDED (Patient not taking: Reported on 2024)      DULoxetine HCl 30 MG CSDR duloxetine 30 mg capsule,delayed release (Patient not taking: Reported on 3/31/2025)      methocarbamol (ROBAXIN) 500 mg tablet Take 1 tablet (500 mg total) by mouth 2 (two) times a day as needed for muscle spasms (Patient not taking: Reported on 3/31/2025) 20 tablet 0    pregabalin (LYRICA) 50 mg capsule Take 50 mg by mouth daily (Patient not taking: Reported on 3/31/2025)       No current facility-administered medications for this visit.       Past Surgical History:   Procedure Laterality Date     SECTION         Social History     Socioeconomic History    Marital status: Single     Spouse name: Not on file    Number of children: Not on file    Years of education: Not on file    Highest education level: Not on file   Occupational History    Not on file   Tobacco Use    Smoking status: Some Days     Current packs/day: 0.50     Average packs/day: 0.5 packs/day for 30.0 years (15.0 ttl pk-yrs)     Types: Cigarettes    Smokeless tobacco: Never   Vaping Use    Vaping status: Never Used   Substance and Sexual Activity    Alcohol use: Not Currently    Drug use: Never    Sexual activity: Not on file   Other Topics Concern    Not on file   Social History Narrative    Not on file     Social Drivers of Health     Financial Resource Strain: Not on file   Food Insecurity: Not on file   Transportation Needs: Not on file   Physical Activity: Not on file   Stress: Not on file   Social Connections: Not on file   Intimate Partner Violence: Not on file   Housing Stability: Not on file       Allergies   Allergen Reactions    Octacosanol Other (See Comments)    Shrimp Extract Allergy Skin Test - Food Allergy GI Intolerance       Review of Systems  General- denies fever/chills  HEENT- denies hearing loss or sore throat  Eyes- denies eye pain or visual disturbances, denies red eyes  Respiratory- denies  cough or SOB  Cardio- denies chest pain or palpitations  GI- denies abdominal pain  Endocrine- denies urinary frequency  Urinary- denies pain with urination  Musculoskeletal- Negative except noted above  Skin- denies rashes or wounds  Neurological- denies dizziness or headache  Psychiatric- denies anxiety or difficulty concentrating    Physical Exam  Ht 5' (1.524 m)   Wt 68.5 kg (151 lb 0.2 oz)   BMI 29.49 kg/m²     General/Constitutional: No apparent distress: well-nourished and well developed.  Lymphatic: No appreciable lymphadenopathy  Respiratory: Non-labored breathing  Vascular: No edema, swelling or tenderness, except as noted in detailed exam.  Integumentary: No impressive skin lesions present, except as noted in detailed exam.  Psych: Normal mood and affect, oriented to person, place and time.  MSK: normal other than stated in HPI and exam  Gait & balance: no evidence of myelopathic gait, ambulates Independently     Cervical  spine range of motion:  -Forward flexion to 90  -Extension to neutral  -Lateral bend 25 right, 25 left  -Rotation 25 right, 25 left  There tenderness with palpation along lumbar paraspinal musculature, no midline tenderness      Neurologic:     Lower Extremity Motor Function     Right  Left    Iliopsoas  5/5  5/5    Quadriceps 5/5 5/5   Tibialis anterior  5/5  5/5    EHL  5/5  5/5    Gastroc. muscle  5/5  5/5    Heel rise  5/5  5/5    Toe rise  5/5  5/5       Sensory: light touch is intact to bilateral upper and lower extremities      Reflexes:     Right Left   Patellar 1+ 1+   Achilles 1+ 1+   Babinski neg neg      Other tests:  Straight Leg Raise: negative  Delonte SI: positive  ANNA SI: negative  Greater troch: no tenderness   Internal/external hip ROM: intact, no pain   Flexion/extension knee ROM: intact, no pain   Vascular: WWP extremities, 2+DP bilateral      Diagnostic Tests   IMAGING: I have personally reviewed the images and these are my findings:  Cervical Spine X-Rays from  "8/13/24: multi level cervical disc degeneration with disc desiccation, mild loss of disc height, anterior osteophytes, most noted at C4-5     Cervical Spine MRI from 11/23/2024: multi level cervical disc degeneration with disc desiccation, loss of disc height, facet and ligamentum hypertrophy, varying degrees of mild foraminal stenosis, no significant central stenosis, no cord signal abnormalities      Procedures, if performed today     None performed       Portions of the record may have been created with voice recognition software.  Occasional wrong word or \"sound a like\" substitutions may have occurred due to the inherent limitations of voice recognition software.  Read the chart carefully and recognize, using context, where substitutions have occurred.    "

## 2025-06-04 ENCOUNTER — APPOINTMENT (EMERGENCY)
Dept: RADIOLOGY | Facility: HOSPITAL | Age: 60
End: 2025-06-04
Payer: COMMERCIAL

## 2025-06-04 ENCOUNTER — HOSPITAL ENCOUNTER (EMERGENCY)
Facility: HOSPITAL | Age: 60
Discharge: HOME | End: 2025-06-04
Attending: EMERGENCY MEDICINE
Payer: COMMERCIAL

## 2025-06-04 VITALS
RESPIRATION RATE: 18 BRPM | SYSTOLIC BLOOD PRESSURE: 157 MMHG | OXYGEN SATURATION: 98 % | HEART RATE: 99 BPM | DIASTOLIC BLOOD PRESSURE: 84 MMHG | TEMPERATURE: 98.2 F

## 2025-06-04 DIAGNOSIS — M54.2 NECK PAIN: Primary | ICD-10-CM

## 2025-06-04 PROCEDURE — 99283 EMERGENCY DEPT VISIT LOW MDM: CPT

## 2025-06-04 PROCEDURE — 72040 X-RAY EXAM NECK SPINE 2-3 VW: CPT

## 2025-06-04 PROCEDURE — 99284 EMERGENCY DEPT VISIT MOD MDM: CPT | Performed by: EMERGENCY MEDICINE

## 2025-06-04 PROCEDURE — 96372 THER/PROPH/DIAG INJ SC/IM: CPT

## 2025-06-04 RX ORDER — KETOROLAC TROMETHAMINE 30 MG/ML
15 INJECTION, SOLUTION INTRAMUSCULAR; INTRAVENOUS ONCE
Status: COMPLETED | OUTPATIENT
Start: 2025-06-04 | End: 2025-06-04

## 2025-06-04 RX ORDER — LIDOCAINE 50 MG/G
1 PATCH TOPICAL ONCE
Status: DISCONTINUED | OUTPATIENT
Start: 2025-06-04 | End: 2025-06-04 | Stop reason: HOSPADM

## 2025-06-04 RX ORDER — ACETAMINOPHEN 325 MG/1
650 TABLET ORAL ONCE
Status: COMPLETED | OUTPATIENT
Start: 2025-06-04 | End: 2025-06-04

## 2025-06-04 RX ADMIN — KETOROLAC TROMETHAMINE 15 MG: 30 INJECTION, SOLUTION INTRAMUSCULAR; INTRAVENOUS at 15:36

## 2025-06-04 RX ADMIN — ACETAMINOPHEN 650 MG: 325 TABLET, FILM COATED ORAL at 15:36

## 2025-06-04 RX ADMIN — LIDOCAINE 1 PATCH: 50 PATCH CUTANEOUS at 15:35

## 2025-06-04 NOTE — DISCHARGE INSTRUCTIONS
You were seen in the Emergency Department for: Neck pain    Your workup today showed: Normal x-ray    Your next steps should include: Schedule appointment with your orthopedic doctor, use Tylenol and ibuprofen for pain control    Reasons to RETURN IMMEDIATELY to the Emergency Department: You begin to have numbness or tingling, weakness, or develop any new or worsening symptoms that concern you

## 2025-06-04 NOTE — ED ATTENDING ATTESTATION
6/4/2025  I, Stanislav Kline MD, saw and evaluated the patient. I have discussed the patient with the resident/non-physician practitioner and agree with the resident's/non-physician practitioner's findings, Plan of Care, and MDM as documented in the resident's/non-physician practitioner's note, except where noted. All available labs and Radiology studies were reviewed.  I was present for key portions of any procedure(s) performed by the resident/non-physician practitioner and I was immediately available to provide assistance.       At this point I agree with the current assessment done in the Emergency Department.  I have conducted an independent evaluation of this patient a history and physical is as follows:      S:  Chief Complaint   Patient presents with    Neck Pain     Pt expresses neck pain and feeling of it being stiff since this morning. Hx of degenerative disc issue      Radha is a 60 y.o. female who presents with the chief complaint of neck pain and stiffness.  She reports she woke up this morning without pain but while at work (works as an ) developed pain with movement and stiffness.  No headache, no fevers, no chills.  No recent trauma.  She did have a fall in the remote past and has had neck pain off and on since then.  She is following with chiropractic and orthopedic physicians for this complaint.  She has had an MRI to evaluate.  No new numbness or weakness.      O:  ED Triage Vitals   Temperature Pulse Respirations Blood Pressure SpO2   06/04/25 1329 06/04/25 1329 06/04/25 1329 06/04/25 1329 06/04/25 1329   98.2 °F (36.8 °C) 99 18 157/84 98 %      Temp Source Heart Rate Source Patient Position - Orthostatic VS BP Location FiO2 (%)   06/04/25 1329 06/04/25 1329 06/04/25 1329 06/04/25 1329 --   Oral Monitor Sitting Right arm       Pain Score       06/04/25 1536       8         Physical Exam  Vitals and nursing note reviewed.   Constitutional:       General: She is in acute  distress.      Appearance: She is well-developed.   HENT:      Head: Normocephalic and atraumatic.     Eyes:      Pupils: Pupils are equal, round, and reactive to light.     Neck:      Vascular: No JVD.     Cardiovascular:      Rate and Rhythm: Normal rate and regular rhythm.      Heart sounds: Normal heart sounds. No murmur heard.     No friction rub. No gallop.   Pulmonary:      Effort: Pulmonary effort is normal. No respiratory distress.      Breath sounds: Normal breath sounds. No wheezing or rales.   Chest:      Chest wall: No tenderness.     Musculoskeletal:         General: Tenderness (mild bilateral trapezius muscle pain, mild midline (c7) pain, no stepoffs) present. Normal range of motion.      Cervical back: Normal range of motion.     Skin:     General: Skin is warm and dry.     Neurological:      General: No focal deficit present.      Mental Status: She is alert and oriented to person, place, and time.     Psychiatric:         Behavior: Behavior normal.         Thought Content: Thought content normal.         Judgment: Judgment normal.       A/P:  Atraumatic neck pain without red flag symptoms for neurologic injury.  Will treat pain, will refer back to ortho/chiro for further treatment/evaluation    ED Course     Medications   acetaminophen (TYLENOL) tablet 650 mg (650 mg Oral Given 6/4/25 1536)   ketorolac (TORADOL) injection 15 mg (15 mg Intramuscular Given 6/4/25 1536)       XR spine cervical 2 or 3 vw injury   ED Interpretation   This film was interpreted independently by me.  No fracture or dislocation.    Normal alignment.       Final Result      No acute osseous abnormality.      Degenerative changes as described         Workstation performed: [id number]             Critical Care Time  Procedures    Time reflects when diagnosis was documented in both MDM as applicable and the Disposition within this note       Time User Action Codes Description Comment    6/4/2025  4:32 PM Kesha Kline Add  [M54.2] Neck pain           ED Disposition       ED Disposition   Discharge    Condition   Stable    Date/Time   Wed Jun 4, 2025  4:32 PM    Comment   Radha Jessica discharge to home/self care.                   Follow-up Information       Follow up With Specialties Details Why Contact Info Additional Information     [facility] Emergency Medicine Go to  As needed [address] [facility] [address]  [phone] [facility], [facility] [place], [place], [address]    Declan White MD Orthopedic Spine Surgery, Orthopedic Surgery Schedule an appointment as soon as possible for a visit   [address] [place] [address]  Second Floor  [facility] [address] [phone]

## 2025-06-09 NOTE — ED PROVIDER NOTES
Time reflects when diagnosis was documented in both MDM as applicable and the Disposition within this note       Time User Action Codes Description Comment    6/4/2025  4:32 PM EliudKesha Add [M54.2] Neck pain           ED Disposition       ED Disposition   Discharge    Condition   Stable    Date/Time   Wed Jun 4, 2025  4:32 PM    Comment   Radha Jessica discharge to home/self care.                   Assessment & Plan       Medical Decision Making  Amount and/or Complexity of Data Reviewed  Radiology: ordered and independent interpretation performed.    Risk  OTC drugs.  Prescription drug management.      Radha Jessica is a 60 year old female past medical history of chronic neck pain presenting to the ED for neck pain.   Overall, patient does not have any new numbness or weakness of be concerning for neurologic component.  Patient's pain improved with Tylenol, Toradol and lidocaine patch.  Obtain C-spine XRAY to ensure good alignment.  C-spine x-ray showed no signs of dislocation and showed good alignment.   Discharged and told to follow-up with her orthopedic doctor.     Dispo: discharge to home  Prescriptions: None  Other: Follow-up with orthopedic doctor.    Discussed results and plan with patient. Patient was agreeable and expressed understanding. Discussed return precautions.           Medications   acetaminophen (TYLENOL) tablet 650 mg (650 mg Oral Given 6/4/25 1536)   ketorolac (TORADOL) injection 15 mg (15 mg Intramuscular Given 6/4/25 1536)       ED Risk Strat Scores            No data recorded                History of Present Illness       Chief Complaint   Patient presents with    Neck Pain     Pt expresses neck pain and feeling of it being stiff since this morning. Hx of degenerative disc issue        Past Medical History[1]   Past Surgical History[2]   Family History[3]   Social History[4]   E-Cigarette/Vaping    E-Cigarette Use Never User       E-Cigarette/Vaping Substances    Nicotine No      THC No     CBD No     Flavoring No     Other No     Unknown No       I have reviewed and agree with the history as documented.       Neck Pain  Associated symptoms: no chest pain, no fever and no headaches      Radha Jessica is a 60 year old female past medical history of chronic neck pain presenting to the ED for neck pain.  She obtained the neck pain after a fall.  She states that while at work today, her neck pain acutely worsened.  She denies any numbness, tingling.  Does not have any fever, nausea, vomiting or any other symptoms that concern her.    Review of Systems   Constitutional:  Negative for chills and fever.   HENT:  Negative for congestion and rhinorrhea.    Eyes:  Negative for visual disturbance.   Respiratory:  Negative for cough and shortness of breath.    Cardiovascular:  Negative for chest pain.   Gastrointestinal:  Negative for abdominal pain, nausea and vomiting.   Musculoskeletal:  Positive for neck pain. Negative for back pain.   Skin:  Negative for wound.   Neurological:  Negative for dizziness, light-headedness and headaches.           Objective       ED Triage Vitals   Temperature Pulse Blood Pressure Respirations SpO2 Patient Position - Orthostatic VS   06/04/25 1329 06/04/25 1329 06/04/25 1329 06/04/25 1329 06/04/25 1329 06/04/25 1329   98.2 °F (36.8 °C) 99 157/84 18 98 % Sitting      Temp Source Heart Rate Source BP Location FiO2 (%) Pain Score    06/04/25 1329 06/04/25 1329 06/04/25 1329 -- 06/04/25 1536    Oral Monitor Right arm  8      Vitals      Date and Time Temp Pulse SpO2 Resp BP Pain Score FACES Pain Rating User   06/04/25 1536 -- -- -- -- -- 8 -- KL   06/04/25 1329 98.2 °F (36.8 °C) 99 98 % 18 157/84 -- -- KK            Physical Exam  Constitutional:       General: She is in acute distress.   HENT:      Head: Normocephalic and atraumatic.      Mouth/Throat:      Mouth: Mucous membranes are moist.      Pharynx: Oropharynx is clear.     Eyes:      Extraocular Movements:  Extraocular movements intact.      Conjunctiva/sclera: Conjunctivae normal.       Cardiovascular:      Rate and Rhythm: Normal rate and regular rhythm.      Pulses: Normal pulses.      Heart sounds: Normal heart sounds.   Pulmonary:      Effort: Pulmonary effort is normal. No respiratory distress.   Abdominal:      Palpations: Abdomen is soft.      Tenderness: There is no abdominal tenderness.     Musculoskeletal:         General: No deformity. Normal range of motion.      Cervical back: Normal range of motion. Tenderness (Left-sided trapezius pain to palpation, no midline tenderness.) present.     Skin:     General: Skin is warm and dry.      Capillary Refill: Capillary refill takes less than 2 seconds.     Neurological:      General: No focal deficit present.      Mental Status: She is alert and oriented to person, place, and time. Mental status is at baseline.         Results Reviewed       None            XR spine cervical 2 or 3 vw injury   ED Interpretation by Stanislav Kline MD (06/04 4408)   This film was interpreted independently by me.  No fracture or dislocation.    Normal alignment.       Final Interpretation by Jass Acuna MD (06/04 2410)      No acute osseous abnormality.      Degenerative changes as described         Workstation performed: [id number]             Procedures    ED Medication and Procedure Management   Prior to Admission Medications   Prescriptions Last Dose Informant Patient Reported? Taking?   DULoxetine HCl 30 MG CSDR   Yes No   Sig: duloxetine 30 mg capsule,delayed release   Patient not taking: Reported on 3/31/2025   Diclofenac Sodium (VOLTAREN) 1 %   No No   Sig: Apply 2 g topically 4 (four) times a day   albuterol (2.5 mg/3 mL) 0.083 % nebulizer solution   No No   Sig: Take 3 mL (2.5 mg total) by nebulization every 4 (four) hours as needed for wheezing or shortness of breath   albuterol (2.5 mg/3 mL) 0.083 % nebulizer solution   No No   Sig: Take 3 mL (2.5 mg total) by  nebulization every 6 (six) hours as needed for wheezing or shortness of breath   albuterol (PROVENTIL HFA,VENTOLIN HFA) 90 mcg/act inhaler   Yes No   Sig: albuterol sulfate HFA 90 mcg/actuation aerosol inhaler   celecoxib (CeleBREX) 200 mg capsule   Yes No   Sig: TAKE 1 CAPSULE BY MOUTH TWICE A DAY WITH FOOD FOR 30 DAYS AS NEEDED   Patient not taking: Reported on 12/17/2024   clotrimazole 1 % external solution   Yes No   Sig: clotrimazole 1 % topical solution   diclofenac sodium (VOLTAREN) 1 %   No No   Sig: Apply 2 g topically 4 (four) times a day as needed (Pain)   fluticasone (FLOVENT HFA) 110 MCG/ACT inhaler   Yes No   Sig: Flovent  mcg/actuation aerosol inhaler   methocarbamol (ROBAXIN) 500 mg tablet   No No   Sig: Take 1 tablet (500 mg total) by mouth 2 (two) times a day as needed for muscle spasms   Patient not taking: Reported on 3/31/2025   naproxen (NAPROSYN) 500 mg tablet   No No   Sig: Take 1 tablet (500 mg total) by mouth 2 (two) times a day with meals   oxyCODONE (ROXICODONE) 5 immediate release tablet   Yes No   Sig: TAKE 2 TABLETS BY MOUTH 3 TIMES A DAY AS NEEDED FOR PAIN   oxyCODONE-acetaminophen (PERCOCET) 5-325 mg per tablet   Yes No   Sig: oxycodone-acetaminophen 5 mg-325 mg tablet   pregabalin (LYRICA) 50 mg capsule   Yes No   Sig: Take 50 mg by mouth daily   Patient not taking: Reported on 3/31/2025      Facility-Administered Medications: None     Discharge Medication List as of 6/4/2025  4:33 PM        CONTINUE these medications which have NOT CHANGED    Details   !! albuterol (2.5 mg/3 mL) 0.083 % nebulizer solution Take 3 mL (2.5 mg total) by nebulization every 4 (four) hours as needed for wheezing or shortness of breath, Starting Mon 1/10/2022, Normal      !! albuterol (2.5 mg/3 mL) 0.083 % nebulizer solution Take 3 mL (2.5 mg total) by nebulization every 6 (six) hours as needed for wheezing or shortness of breath, Starting u 1/4/2024, Normal      albuterol (PROVENTIL HFA,VENTOLIN  HFA) 90 mcg/act inhaler albuterol sulfate HFA 90 mcg/actuation aerosol inhaler, Historical Med      celecoxib (CeleBREX) 200 mg capsule TAKE 1 CAPSULE BY MOUTH TWICE A DAY WITH FOOD FOR 30 DAYS AS NEEDED, Historical Med      clotrimazole 1 % external solution clotrimazole 1 % topical solution, Historical Med      diclofenac sodium (VOLTAREN) 1 % Apply 2 g topically 4 (four) times a day as needed (Pain), Starting Tue 3/3/2020, Normal      Diclofenac Sodium (VOLTAREN) 1 % Apply 2 g topically 4 (four) times a day, Starting Tue 11/7/2023, Normal      DULoxetine HCl 30 MG CSDR duloxetine 30 mg capsule,delayed release, Historical Med      fluticasone (FLOVENT HFA) 110 MCG/ACT inhaler Flovent  mcg/actuation aerosol inhaler, Historical Med      methocarbamol (ROBAXIN) 500 mg tablet Take 1 tablet (500 mg total) by mouth 2 (two) times a day as needed for muscle spasms, Starting Tue 3/3/2020, Normal      naproxen (NAPROSYN) 500 mg tablet Take 1 tablet (500 mg total) by mouth 2 (two) times a day with meals, Starting Wed 7/14/2021, Normal      oxyCODONE (ROXICODONE) 5 immediate release tablet TAKE 2 TABLETS BY MOUTH 3 TIMES A DAY AS NEEDED FOR PAIN, Historical Med      oxyCODONE-acetaminophen (PERCOCET) 5-325 mg per tablet oxycodone-acetaminophen 5 mg-325 mg tablet, Historical Med      pregabalin (LYRICA) 50 mg capsule Take 50 mg by mouth daily, Starting Mon 8/23/2021, Historical Med       !! - Potential duplicate medications found. Please discuss with provider.        No discharge procedures on file.  ED SEPSIS DOCUMENTATION   Time reflects when diagnosis was documented in both MDM as applicable and the Disposition within this note       Time User Action Codes Description Comment    6/4/2025  4:32 PM Kesha Kline Add [M54.2] Neck pain                    [1]   Past Medical History:  Diagnosis Date    Anxiety     Arthritis     hands    Asthma     Degenerative lumbar disc     Depression     Diabetes (HCC) 07/08/2024   [2]    Past Surgical History:  Procedure Laterality Date     SECTION     [3]   Family History  Family history unknown: Yes   [4]   Social History  Tobacco Use    Smoking status: Some Days     Current packs/day: 0.50     Average packs/day: 0.5 packs/day for 30.0 years (15.0 ttl pk-yrs)     Types: Cigarettes    Smokeless tobacco: Never   Vaping Use    Vaping status: Never Used   Substance Use Topics    Alcohol use: Not Currently    Drug use: Never        Kesha Kline MD  25 [id number]

## 2025-06-30 ENCOUNTER — OFFICE VISIT (OUTPATIENT)
Dept: OBGYN CLINIC | Facility: HOSPITAL | Age: 60
End: 2025-06-30
Payer: COMMERCIAL

## 2025-06-30 VITALS — HEIGHT: 60 IN | WEIGHT: 151.01 LBS | BODY MASS INDEX: 29.65 KG/M2

## 2025-06-30 DIAGNOSIS — M54.12 CERVICAL RADICULOPATHY: Primary | ICD-10-CM

## 2025-06-30 DIAGNOSIS — M47.812 CERVICAL SPONDYLOSIS: ICD-10-CM

## 2025-06-30 PROCEDURE — 99213 OFFICE O/P EST LOW 20 MIN: CPT | Performed by: ORTHOPAEDIC SURGERY

## 2025-06-30 NOTE — PROGRESS NOTES
Name: Radha Jessica      : 1965       MRN: [id number]   Encounter Provider: Declan White MD   Encounter Date: 25  Encounter department: [facility]    Medical Decision Making:     Assessment & Plan  Cervical radiculopathy  below       Cervical spondylosis  below          60 y.o. female with Neck Pain and Bilateral Periscapular Pain and imaging findings most notable for Cervical Spondylosis        The clinical, physical and imaging findings were reviewed with the patient.  Radha  has a constellation of findings consistent with Cervical Radiculopathyin the setting of cervical degenerative disease.      Fortunately patient remains neurologically intact and functional.   We discussed the treatment options including physical therapy, at home exercises, activity modifications, chiropractic medicine, oral medications, interventional spine procedures.  At this time recommend continued conservative treatments.       The patient is currently managing her symptoms with conservative care  with  at [facility].   She is encouraged to continue her treatment plan.  If left arm pain and weakness symptoms increase then surgery can be considered.      Patient instructed to return to office/ER sooner if symptoms are not improving, getting worse, or new worrisome/neurologic symptoms arise.  The patient can follow up as needed and is welcome to return at any point with any new or old issue.     Subjective:      Chief Complaint: Neck Pain    HPI:   Radha Jessica is a 59 y.o. female presenting with chief complaint of neck pain and low back pain.  Pain began 1 month ago after sustaining a fall after walking on uneven surface. She states she fell onto her left side which aggravated her low back pain and neck pain.      Describes pain as sharp and burning in nature.  Located in low back.  Denies numbness of lower extremities.  She reports numbness of left arm.  Reports burning.  Back pain 70%, Leg pain 30%.  Right> left. Pain is worse with prolonged standing and walking and improves with rest.     Denies any lucas trauma. Denies fever or chills, no night sweats. Denies any bladder or bowel changes.       Conservative therapy includes the following:   Medications: Oxycodone  Injections: Had epidural done in July 2024 with Dr. Marcum [facility] pain center states it provided no relief.     Physical Therapy: she has done PT for her low back  Chiropractic Medicine: has not attempted  Accupunture/Massage Therapy: has not attempted   These therapeutic modalities were ineffective at providing sustained pain relief/functional improvement.      Nicotine dependent: smokes occasionally   Occupation: admin worker  Living situation: Lives with family   ADLs: patient is able to perform      11/5/24 Update  Patient is here for follow up.   Has been doing physical therapy for back and neck.  Continues with pain.  Now has worsening headaches.  Recently had injection in October Dr. Marcum and will have next shot Nov 11.   Notes pain with walking in gluteal region. +shopping cart sign.   Has had about 6 lumbar injections including epidurals which only temporarily helped.      12/2/24 Update  Patient is here for follow up. She reports intermittent neck pain and headaches, especially with cold or bad weather. Physical therapy has been placed on hold due to no improvement. She was at extreme pain at her last visit. She has difficulty with cervical range of motion. Has had two cervical epidural steroid injections, last injection on 11/11/2024. No relief in symptoms since last injections. Bilateral radiating symptoms in arms.     3/31/25:  Patient presents to the office today regarding her cervical spine.  Patient reports that she has intermittent pain about the cervical spine but lately it has been more constant and intermittent.  Patient reports that the pain  is described as sharp and stabbing as well as dull and achy and will radiate around to the front of the neck.  Patient has any pain that radiates down her arms.  Patient Nuys any numbness tingling at this time.  Patient has had her spinal nerves burned on the left side with her spine and pain provider that is outside of [facility].  Patient denies any change to her pain at all.  Patient reports that she is frustrated today and believes that she is not getting improvement with physical therapy, chiropractic medicine, or medications.  Patient with like to refrain from pain medication such as narcotics and over-the-counter due to fear of their side effects.  Patient would like to treat her pain opposed to mask it.    6/30/2025 update:  The patient presents for follow up of cervical spine.  She is s/p left cervical RFA 4/2025 with [facility], .  She notes upcoming cervical injection as well.   She describes good and bad days.  Today she complains of neck pain with left arm pain to hand with left hand weakness.  She notes onset of left hip and thigh pain over past several weeks.   She does use occasional oxycodone and Naproxen for pain.  She has done physical therapy with limited benefit.   She denies past neck surgery.      Objective:     Family History   Family history unknown: Yes       Past Medical History:   Diagnosis Date    Anxiety     Arthritis     hands    Asthma     Degenerative lumbar disc     Depression     Diabetes (HCC) 07/08/2024       Current Outpatient Medications   Medication Sig Dispense Refill    albuterol (2.5 mg/3 mL) 0.083 % nebulizer solution Take 3 mL (2.5 mg total) by nebulization every 4 (four) hours as needed for wheezing or shortness of breath 150 mL 0    albuterol (2.5 mg/3 mL) 0.083 % nebulizer solution Take 3 mL (2.5 mg total) by nebulization every 6 (six) hours as needed for wheezing or shortness of breath 75 mL 0    albuterol (PROVENTIL HFA,VENTOLIN HFA) 90 mcg/act  inhaler albuterol sulfate HFA 90 mcg/actuation aerosol inhaler      celecoxib (CeleBREX) 200 mg capsule TAKE 1 CAPSULE BY MOUTH TWICE A DAY WITH FOOD FOR 30 DAYS AS NEEDED (Patient not taking: Reported on 2024)      clotrimazole 1 % external solution clotrimazole 1 % topical solution      diclofenac sodium (VOLTAREN) 1 % Apply 2 g topically 4 (four) times a day as needed (Pain) 100 g 0    Diclofenac Sodium (VOLTAREN) 1 % Apply 2 g topically 4 (four) times a day 100 g 1    DULoxetine HCl 30 MG CSDR duloxetine 30 mg capsule,delayed release (Patient not taking: Reported on 3/31/2025)      fluticasone (FLOVENT HFA) 110 MCG/ACT inhaler Flovent  mcg/actuation aerosol inhaler      methocarbamol (ROBAXIN) 500 mg tablet Take 1 tablet (500 mg total) by mouth 2 (two) times a day as needed for muscle spasms (Patient not taking: Reported on 3/31/2025) 20 tablet 0    naproxen (NAPROSYN) 500 mg tablet Take 1 tablet (500 mg total) by mouth 2 (two) times a day with meals 60 tablet 0    oxyCODONE (ROXICODONE) 5 immediate release tablet TAKE 2 TABLETS BY MOUTH 3 TIMES A DAY AS NEEDED FOR PAIN      oxyCODONE-acetaminophen (PERCOCET) 5-325 mg per tablet oxycodone-acetaminophen 5 mg-325 mg tablet      pregabalin (LYRICA) 50 mg capsule Take 50 mg by mouth daily (Patient not taking: Reported on 3/31/2025)       No current facility-administered medications for this visit.       Past Surgical History:   Procedure Laterality Date     SECTION         Social History     Socioeconomic History    Marital status: Single     Spouse name: Not on file    Number of children: Not on file    Years of education: Not on file    Highest education level: Not on file   Occupational History    Not on file   Tobacco Use    Smoking status: Some Days     Current packs/day: 0.50     Average packs/day: 0.5 packs/day for 30.0 years (15.0 ttl pk-yrs)     Types: Cigarettes    Smokeless tobacco: Never   Vaping Use    Vaping status: Never Used    Substance and Sexual Activity    Alcohol use: Not Currently    Drug use: Never    Sexual activity: Not on file   Other Topics Concern    Not on file   Social History Narrative    Not on file     Social Drivers of Health     Financial Resource Strain: Not on file   Food Insecurity: Not on file   Transportation Needs: Not on file   Physical Activity: Not on file   Stress: Not on file   Social Connections: Not on file   Intimate Partner Violence: Not on file   Housing Stability: Not on file       Allergies   Allergen Reactions    Octacosanol Other (See Comments)    Shrimp Extract Allergy Skin Test - Food Allergy GI Intolerance       Review of Systems  General- denies fever/chills  HEENT- denies hearing loss or sore throat  Eyes- denies eye pain or visual disturbances, denies red eyes  Respiratory- denies cough or SOB  Cardio- denies chest pain or palpitations  GI- denies abdominal pain  Endocrine- denies urinary frequency  Urinary- denies pain with urination  Musculoskeletal- Negative except noted above  Skin- denies rashes or wounds  Neurological- denies dizziness or headache  Psychiatric- denies anxiety or difficulty concentrating    Physical Exam  Ht 5' (1.524 m)   Wt 68.5 kg (151 lb 0.2 oz)   BMI 29.49 kg/m²     General/Constitutional: No apparent distress: well-nourished and well developed.  Lymphatic: No appreciable lymphadenopathy  Respiratory: Non-labored breathing  Vascular: No edema, swelling or tenderness, except as noted in detailed exam.  Integumentary: No impressive skin lesions present, except as noted in detailed exam.  Psych: Normal mood and affect, oriented to person, place and time.  MSK: normal other than stated in HPI and exam  Gait & balance: no evidence of myelopathic gait, ambulates Independently     Cervical  spine range of motion:  -Forward flexion to 90  -Extension to neutral  -Lateral bend 25 right, 25 left  -Rotation 25 right, 25 left  There tenderness with palpation along lumbar  "paraspinal musculature, no midline tenderness      Neurologic:     Lower Extremity Motor Function     Right  Left    Iliopsoas  5/5  5/5    Quadriceps 5/5 5/5   Tibialis anterior  5/5  5/5    EHL  5/5  5/5    Gastroc. muscle  5/5  5/5    Heel rise  5/5  5/5    Toe rise  5/5  5/5       Sensory: light touch is intact to bilateral upper and lower extremities      Reflexes:     Right Left   Patellar 1+ 1+   Achilles 1+ 1+   Babinski neg neg      Other tests:  Straight Leg Raise: negative  Delonte SI: positive  ANNA SI: negative  Greater troch: no tenderness   Internal/external hip ROM: intact, no pain   Flexion/extension knee ROM: intact, no pain   Vascular: WWP extremities, 2+DP bilateral      Diagnostic Tests   IMAGING: I have personally reviewed the images and these are my findings:  Cervical Spine X-Rays from 8/13/24: multi level cervical disc degeneration with disc desiccation, mild loss of disc height, anterior osteophytes, most noted at C4-5     Cervical Spine MRI from 11/23/2024: multi level cervical disc degeneration with disc desiccation, loss of disc height, facet and ligamentum hypertrophy, varying degrees of mild foraminal stenosis, no significant central stenosis, no cord signal abnormalities      Procedures, if performed today     None performed       Portions of the record may have been created with voice recognition software.  Occasional wrong word or \"sound a like\" substitutions may have occurred due to the inherent limitations of voice recognition software.  Read the chart carefully and recognize, using context, where substitutions have occurred.    "

## 2025-08-15 ENCOUNTER — APPOINTMENT (OUTPATIENT)
Dept: LAB | Facility: CLINIC | Age: 60
End: 2025-08-15
Attending: FAMILY MEDICINE
Payer: COMMERCIAL